# Patient Record
Sex: FEMALE | Race: WHITE | NOT HISPANIC OR LATINO
[De-identification: names, ages, dates, MRNs, and addresses within clinical notes are randomized per-mention and may not be internally consistent; named-entity substitution may affect disease eponyms.]

---

## 2020-01-14 ENCOUNTER — APPOINTMENT (OUTPATIENT)
Dept: OTOLARYNGOLOGY | Facility: HOSPITAL | Age: 60
End: 2020-01-14

## 2020-09-06 ENCOUNTER — EMERGENCY (EMERGENCY)
Facility: HOSPITAL | Age: 60
LOS: 1 days | Discharge: HOME | End: 2020-09-06
Attending: EMERGENCY MEDICINE
Payer: COMMERCIAL

## 2020-09-06 VITALS
TEMPERATURE: 97 F | RESPIRATION RATE: 16 BRPM | WEIGHT: 111.99 LBS | HEART RATE: 95 BPM | DIASTOLIC BLOOD PRESSURE: 85 MMHG | SYSTOLIC BLOOD PRESSURE: 145 MMHG

## 2020-09-06 VITALS
SYSTOLIC BLOOD PRESSURE: 136 MMHG | HEART RATE: 74 BPM | DIASTOLIC BLOOD PRESSURE: 89 MMHG | RESPIRATION RATE: 16 BRPM | TEMPERATURE: 98 F | OXYGEN SATURATION: 97 %

## 2020-09-06 LAB
ALBUMIN SERPL ELPH-MCNC: 4.2 G/DL — SIGNIFICANT CHANGE UP (ref 3.5–5)
ALP SERPL-CCNC: 71 U/L — SIGNIFICANT CHANGE UP (ref 40–120)
ALT FLD-CCNC: 19 U/L DA — SIGNIFICANT CHANGE UP (ref 10–60)
ANION GAP SERPL CALC-SCNC: 3 MMOL/L — LOW (ref 5–17)
AST SERPL-CCNC: 24 U/L — SIGNIFICANT CHANGE UP (ref 10–40)
BASOPHILS # BLD AUTO: 0.04 K/UL — SIGNIFICANT CHANGE UP (ref 0–0.2)
BASOPHILS NFR BLD AUTO: 0.6 % — SIGNIFICANT CHANGE UP (ref 0–2)
BILIRUB SERPL-MCNC: 0.3 MG/DL — SIGNIFICANT CHANGE UP (ref 0.2–1.2)
BUN SERPL-MCNC: 16 MG/DL — SIGNIFICANT CHANGE UP (ref 7–18)
CALCIUM SERPL-MCNC: 9.5 MG/DL — SIGNIFICANT CHANGE UP (ref 8.4–10.5)
CHLORIDE SERPL-SCNC: 108 MMOL/L — SIGNIFICANT CHANGE UP (ref 96–108)
CO2 SERPL-SCNC: 31 MMOL/L — SIGNIFICANT CHANGE UP (ref 22–31)
CREAT SERPL-MCNC: 0.66 MG/DL — SIGNIFICANT CHANGE UP (ref 0.5–1.3)
EOSINOPHIL # BLD AUTO: 0.01 K/UL — SIGNIFICANT CHANGE UP (ref 0–0.5)
EOSINOPHIL NFR BLD AUTO: 0.1 % — SIGNIFICANT CHANGE UP (ref 0–6)
ERYTHROCYTE [SEDIMENTATION RATE] IN BLOOD: 6 MM/HR — SIGNIFICANT CHANGE UP (ref 0–20)
GLUCOSE SERPL-MCNC: 98 MG/DL — SIGNIFICANT CHANGE UP (ref 70–99)
HCT VFR BLD CALC: 41.5 % — SIGNIFICANT CHANGE UP (ref 34.5–45)
HGB BLD-MCNC: 13.8 G/DL — SIGNIFICANT CHANGE UP (ref 11.5–15.5)
IMM GRANULOCYTES NFR BLD AUTO: 0.3 % — SIGNIFICANT CHANGE UP (ref 0–1.5)
LYMPHOCYTES # BLD AUTO: 1.28 K/UL — SIGNIFICANT CHANGE UP (ref 1–3.3)
LYMPHOCYTES # BLD AUTO: 19 % — SIGNIFICANT CHANGE UP (ref 13–44)
MCHC RBC-ENTMCNC: 29.7 PG — SIGNIFICANT CHANGE UP (ref 27–34)
MCHC RBC-ENTMCNC: 33.3 GM/DL — SIGNIFICANT CHANGE UP (ref 32–36)
MCV RBC AUTO: 89.2 FL — SIGNIFICANT CHANGE UP (ref 80–100)
MONOCYTES # BLD AUTO: 0.24 K/UL — SIGNIFICANT CHANGE UP (ref 0–0.9)
MONOCYTES NFR BLD AUTO: 3.6 % — SIGNIFICANT CHANGE UP (ref 2–14)
NEUTROPHILS # BLD AUTO: 5.13 K/UL — SIGNIFICANT CHANGE UP (ref 1.8–7.4)
NEUTROPHILS NFR BLD AUTO: 76.4 % — SIGNIFICANT CHANGE UP (ref 43–77)
NRBC # BLD: 0 /100 WBCS — SIGNIFICANT CHANGE UP (ref 0–0)
PLATELET # BLD AUTO: 263 K/UL — SIGNIFICANT CHANGE UP (ref 150–400)
POTASSIUM SERPL-MCNC: 4.6 MMOL/L — SIGNIFICANT CHANGE UP (ref 3.5–5.3)
POTASSIUM SERPL-SCNC: 4.6 MMOL/L — SIGNIFICANT CHANGE UP (ref 3.5–5.3)
PROT SERPL-MCNC: 8.3 G/DL — SIGNIFICANT CHANGE UP (ref 6–8.3)
RBC # BLD: 4.65 M/UL — SIGNIFICANT CHANGE UP (ref 3.8–5.2)
RBC # FLD: 12.1 % — SIGNIFICANT CHANGE UP (ref 10.3–14.5)
SODIUM SERPL-SCNC: 142 MMOL/L — SIGNIFICANT CHANGE UP (ref 135–145)
WBC # BLD: 6.72 K/UL — SIGNIFICANT CHANGE UP (ref 3.8–10.5)
WBC # FLD AUTO: 6.72 K/UL — SIGNIFICANT CHANGE UP (ref 3.8–10.5)

## 2020-09-06 PROCEDURE — 99284 EMERGENCY DEPT VISIT MOD MDM: CPT

## 2020-09-06 PROCEDURE — 85652 RBC SED RATE AUTOMATED: CPT

## 2020-09-06 PROCEDURE — 96374 THER/PROPH/DIAG INJ IV PUSH: CPT

## 2020-09-06 PROCEDURE — 80053 COMPREHEN METABOLIC PANEL: CPT

## 2020-09-06 PROCEDURE — 70450 CT HEAD/BRAIN W/O DYE: CPT | Mod: 26

## 2020-09-06 PROCEDURE — 99284 EMERGENCY DEPT VISIT MOD MDM: CPT | Mod: 25

## 2020-09-06 PROCEDURE — 70450 CT HEAD/BRAIN W/O DYE: CPT

## 2020-09-06 PROCEDURE — 36415 COLL VENOUS BLD VENIPUNCTURE: CPT

## 2020-09-06 PROCEDURE — 85027 COMPLETE CBC AUTOMATED: CPT

## 2020-09-06 PROCEDURE — 96375 TX/PRO/DX INJ NEW DRUG ADDON: CPT

## 2020-09-06 RX ORDER — KETOROLAC TROMETHAMINE 30 MG/ML
15 SYRINGE (ML) INJECTION ONCE
Refills: 0 | Status: DISCONTINUED | OUTPATIENT
Start: 2020-09-06 | End: 2020-09-06

## 2020-09-06 RX ORDER — MAGNESIUM SULFATE 500 MG/ML
1 VIAL (ML) INJECTION ONCE
Refills: 0 | Status: COMPLETED | OUTPATIENT
Start: 2020-09-06 | End: 2020-09-06

## 2020-09-06 RX ORDER — METOCLOPRAMIDE HCL 10 MG
10 TABLET ORAL ONCE
Refills: 0 | Status: COMPLETED | OUTPATIENT
Start: 2020-09-06 | End: 2020-09-06

## 2020-09-06 RX ADMIN — Medication 104 MILLIGRAM(S): at 16:01

## 2020-09-06 RX ADMIN — Medication 200 GRAM(S): at 16:00

## 2020-09-06 RX ADMIN — Medication 15 MILLIGRAM(S): at 16:00

## 2020-09-06 RX ADMIN — Medication 15 MILLIGRAM(S): at 16:06

## 2020-09-06 NOTE — ED PROVIDER NOTE - NEUROLOGICAL, MLM
Alert and oriented, no focal deficits, no motor or sensory deficits. Cranial nerves 2-12 intact. Neck supple.

## 2020-09-06 NOTE — ED PROVIDER NOTE - OBJECTIVE STATEMENT
60 year old female with PMHx of pituitary microadenoma presenting to the ED with complaints of frontal, banded headache that is on and off for the past week. Also endorses nausea, vomiting that is non-bloody and non-bilious, and loose stool. States that sound and light bothers her generally, but nothing more than usual. Denies trauma, respiratory symptoms, fever, vision loss, focal weakness, drug use, alcohol use, or any other complaints. Patient took Advil this morning with some relief.

## 2020-09-06 NOTE — ED PROVIDER NOTE - PROGRESS NOTE DETAILS
[place]: ct reviewed and informed pt. headache improve.  neurologically intact  dx headache likely tension vs pituitary adenoma. Must see neurosurgeon. left ambulatory.  return precautions given.

## 2020-09-06 NOTE — ED PROVIDER NOTE - CLINICAL SUMMARY MEDICAL DECISION MAKING FREE TEXT BOX
Patient presenting with tension headache. Will give migraine cocktail, order CT for reassurance, and PCP follow up. No surgery needed for monitoring pituitary microadenoma.

## 2020-09-06 NOTE — ED ADULT NURSE NOTE - CHIEF COMPLAINT QUOTE
patient complains of frontal headache x couple of weeks constantly with nausea and vomiting, patient denies visual disturbances

## 2020-09-06 NOTE — ED PROVIDER NOTE - PATIENT PORTAL LINK FT
You can access the FollowMyHealth Patient Portal offered by [facility] by registering at the following website: [email or web address]. By joining [facility]’s FollowMyHealth portal, you will also be able to view your health information using other applications (apps) compatible with our system.

## 2020-09-06 NOTE — ED PROVIDER NOTE - CHPI ED SYMPTOMS NEG
no fever/no respiratory symptoms, no visual changes, no loss of vison, no focal weakness, no photophobia, no sonophobia

## 2020-10-27 PROBLEM — Z00.00 ENCOUNTER FOR PREVENTIVE HEALTH EXAMINATION: Status: ACTIVE | Noted: 2020-10-27

## 2020-11-02 ENCOUNTER — TRANSCRIPTION ENCOUNTER (OUTPATIENT)
Age: 60
End: 2020-11-02

## 2020-11-02 ENCOUNTER — APPOINTMENT (OUTPATIENT)
Dept: SPINE | Facility: CLINIC | Age: 60
End: 2020-11-02
Payer: COMMERCIAL

## 2020-11-02 VITALS
DIASTOLIC BLOOD PRESSURE: 92 MMHG | HEIGHT: 63 IN | TEMPERATURE: 97.9 F | SYSTOLIC BLOOD PRESSURE: 138 MMHG | OXYGEN SATURATION: 95 % | HEART RATE: 73 BPM | WEIGHT: 110 LBS | BODY MASS INDEX: 19.49 KG/M2

## 2020-11-02 DIAGNOSIS — Z78.9 OTHER SPECIFIED HEALTH STATUS: ICD-10-CM

## 2020-11-02 DIAGNOSIS — Z80.9 FAMILY HISTORY OF MALIGNANT NEOPLASM, UNSPECIFIED: ICD-10-CM

## 2020-11-02 DIAGNOSIS — Z86.39 PERSONAL HISTORY OF OTHER ENDOCRINE, NUTRITIONAL AND METABOLIC DISEASE: ICD-10-CM

## 2020-11-02 PROCEDURE — 99204 OFFICE O/P NEW MOD 45 MIN: CPT

## 2020-11-02 PROCEDURE — 99072 ADDL SUPL MATRL&STAF TM PHE: CPT

## 2020-11-02 RX ORDER — SIMVASTATIN 40 MG/1
40 TABLET, FILM COATED ORAL
Refills: 0 | Status: ACTIVE | COMMUNITY

## 2020-12-02 ENCOUNTER — APPOINTMENT (OUTPATIENT)
Dept: OTOLARYNGOLOGY | Facility: CLINIC | Age: 60
End: 2020-12-02
Payer: COMMERCIAL

## 2020-12-02 VITALS
SYSTOLIC BLOOD PRESSURE: 129 MMHG | HEIGHT: 63 IN | TEMPERATURE: 97.3 F | HEART RATE: 81 BPM | DIASTOLIC BLOOD PRESSURE: 86 MMHG | WEIGHT: 107 LBS | BODY MASS INDEX: 18.96 KG/M2

## 2020-12-02 PROCEDURE — 31231 NASAL ENDOSCOPY DX: CPT

## 2020-12-02 PROCEDURE — 99072 ADDL SUPL MATRL&STAF TM PHE: CPT

## 2020-12-02 PROCEDURE — 99204 OFFICE O/P NEW MOD 45 MIN: CPT | Mod: 25

## 2020-12-02 NOTE — PROCEDURE
[FreeTextEntry6] : Flexible scope #2 was used. Right nasal passage with normal inferior, middle and superior turbinates. Nasal passage patent with clear middle meatus and sphenoethmoid recess. Left nasal passage with normal inferior, middle and superior turbinates. Nasal passage was patent with clear middle meatus and sphenoethmoid recess. No mucopurulence or polyps appreciated. Nasopharynx clear.\par \par

## 2020-12-02 NOTE — REASON FOR VISIT
[Initial Consultation] : an initial consultation for [FreeTextEntry2] : Going for TSPR with Dr. Hernandez 1/14/2020

## 2020-12-02 NOTE — CONSULT LETTER
[Dear  ___] : Dear  [unfilled], [Consult Letter:] : I had the pleasure of evaluating your patient, [unfilled]. [Please see my note below.] : Please see my note below. [Consult Closing:] : Thank you very much for allowing me to participate in the care of this patient.  If you have any questions, please do not hesitate to contact me. [Sincerely,] : Sincerely, [FreeTextEntry3] : Palma Kirkpatrick MD\par Otolaryngology and Cranial Base Surgery\par Attending Physician - Department of Otolaryngology and Head & Neck Surgery\par [facility]\par  - [employer] [facility]\par Office: [phone]\par Fax: [phone]\par

## 2020-12-02 NOTE — ASSESSMENT
[FreeTextEntry1] : Going for TSPR with Dr. Hernandez 1/14/2020\par - discussed my role in the surgery including the nasal and sinus procedures that I will be performing in order to provide the neurosurgeon access to the pituitary tumor\par - expected post-op hospital course discussed including need for possible repair of spinal fluid leak and close observation for 2-3 days\par - post-op care consisting of nasal saline irrigations was reviewed, and [facility] sinus pamphlet was given to patient to obtain prior to surgery so they have ready to use on discharge home\par - discussed post-operative issues including nasal congestion, loss of sense of smell which should be temporary but can be permanent, bloody nasal drainage, facial pressure/headaches, bleeding, and infection\par - all questions answered and patient will call if any further concerns\par - plan to see patient on AM of surgery\par

## 2020-12-02 NOTE — HISTORY OF PRESENT ILLNESS
[de-identified] : Going for TSPR with Dr. Hernandez 1/14/2020.  Had Prior sublabial pituitary approach about 7 years ago. \par No nasal congestion, bleeding, or Clear d/c.  Vision is okay.  Sense of smell and taste are good.  Has numbness to upper teeth and gums from prior surgery.

## 2020-12-04 ENCOUNTER — APPOINTMENT (OUTPATIENT)
Dept: ENDOCRINOLOGY | Facility: CLINIC | Age: 60
End: 2020-12-04
Payer: COMMERCIAL

## 2020-12-04 VITALS
SYSTOLIC BLOOD PRESSURE: 122 MMHG | BODY MASS INDEX: 20.2 KG/M2 | TEMPERATURE: 98.6 F | HEART RATE: 81 BPM | RESPIRATION RATE: 16 BRPM | WEIGHT: 114 LBS | DIASTOLIC BLOOD PRESSURE: 86 MMHG | OXYGEN SATURATION: 96 % | HEIGHT: 63 IN

## 2020-12-04 PROCEDURE — 99072 ADDL SUPL MATRL&STAF TM PHE: CPT

## 2020-12-04 PROCEDURE — 99204 OFFICE O/P NEW MOD 45 MIN: CPT

## 2020-12-04 NOTE — REASON FOR VISIT
[Consultation] : a consultation visit [Pituitary Evaluation/ Disorder] : pituitary evaluation/disorder

## 2020-12-06 NOTE — HISTORY OF PRESENT ILLNESS
[FreeTextEntry1] : 60 year female  referred for pituitary evaluation \par \par Mrs Pena has been diagnosed with an apparently non-secreting 2 cm pituitary adenoma in 2013, and underwent a TSSR by Dr. Newsome. She was briefly seen by an endocrinologist in [place], and remembers having only 1 follow up pituitary MRI since then. She was doing well over the past several years,  but recently developed persistent headaches, and a repeat MRI was done showing a recurrent pituitary tumor. \par MRI (105/20)-  3.8x2.8x2.8 sellar mass extending suprasellar and into the left cavernous sinus, encasing left internal carotid artery  and left auditory canal (with mass effect on the left 4th, 5th. 6th CN). + Mass effect on the optic chiasm. + mild effect on hypothalamus. \par She complains of some balance issues within past 6 months, but denies excessive fatigue, weight changes, peripheral vision changes ;  cold/heat intolerance ; new purple stretch marks or easy bruising  jaw protruding, change in facial features or change in shoe/hat/ring size ;  dizziness/nausea/vomiting/abdominal pain ; galactorrhea. Also, she denies diaphoresis or sweating, chest pain or palpitations, SOB, diarrhea or constipation, anxiety or tremor, muscle aches\par \par

## 2020-12-06 NOTE — ASSESSMENT
[FreeTextEntry1] : Recurrent pituitary adenoma with mass effect.\par - planned for TSPR on 01/14/20\par - preoperative evaluation of the pituitary endocrine function\par - refer to a neuro-ophthalmologist\par - advised on post-operative endocrine complications and a possibility of a permanent panhypopituitarism, especially since she might require post-operative radiation therapy.\par She'll follow up about 1-2 weeks post surgery, or sooner prn\par

## 2020-12-06 NOTE — CONSULT LETTER
[Dear  ___] : Dear  [unfilled], [Sincerely,] : Sincerely, [FreeTextEntry1] : Thank you for referring  Ms. KIP KING to me for evaluation and treatment. Please, see attached consultation note. As always, if there are specific questions you would like to discuss, please feel free to contact me.\par Thank you for the courtesy of this evaluation.\par  [FreeTextEntry3] : Gabrielle Jones MD, FACE, ECNU\par

## 2020-12-07 ENCOUNTER — TRANSCRIPTION ENCOUNTER (OUTPATIENT)
Age: 60
End: 2020-12-07

## 2020-12-10 ENCOUNTER — TRANSCRIPTION ENCOUNTER (OUTPATIENT)
Age: 60
End: 2020-12-10

## 2020-12-10 LAB
ACTH SER-ACNC: 33.9 PG/ML
ALBUMIN SERPL ELPH-MCNC: 4.7 G/DL
ALP BLD-CCNC: 72 U/L
ALT SERPL-CCNC: 8 U/L
ANION GAP SERPL CALC-SCNC: 12 MMOL/L
AST SERPL-CCNC: 26 U/L
BASOPHILS # BLD AUTO: 0.06 K/UL
BASOPHILS NFR BLD AUTO: 1.2 %
BILIRUB SERPL-MCNC: 0.3 MG/DL
BUN SERPL-MCNC: 15 MG/DL
CALCIUM SERPL-MCNC: 9.9 MG/DL
CHLORIDE SERPL-SCNC: 104 MMOL/L
CO2 SERPL-SCNC: 27 MMOL/L
CORTIS SERPL-MCNC: 13.6 UG/DL
CREAT 24H UR-MCNC: 0.8 G/24 H
CREAT ?TM UR-MCNC: 44 MG/DL
CREAT SERPL-MCNC: 0.97 MG/DL
EOSINOPHIL # BLD AUTO: 0.07 K/UL
EOSINOPHIL NFR BLD AUTO: 1.4 %
ESTRADIOL SERPL-MCNC: <5 PG/ML
FSH SERPL-MCNC: 100 IU/L
GH SERPL-MCNC: 6.54 NG/ML
GLUCOSE SERPL-MCNC: 91 MG/DL
HCT VFR BLD CALC: 42.2 %
HGB BLD-MCNC: 13.3 G/DL
IGF-1 INTERP: NORMAL
IGF-I BLD-MCNC: 210 NG/ML
IMM GRANULOCYTES NFR BLD AUTO: 0 %
LH SERPL-ACNC: 42.5 IU/L
LYMPHOCYTES # BLD AUTO: 1.59 K/UL
LYMPHOCYTES NFR BLD AUTO: 31.9 %
MAN DIFF?: NORMAL
MCHC RBC-ENTMCNC: 28.7 PG
MCHC RBC-ENTMCNC: 31.5 GM/DL
MCV RBC AUTO: 90.9 FL
MONOCYTES # BLD AUTO: 0.42 K/UL
MONOCYTES NFR BLD AUTO: 8.4 %
NEUTROPHILS # BLD AUTO: 2.84 K/UL
NEUTROPHILS NFR BLD AUTO: 57.1 %
OSMOLALITY SERPL: 300 MOSMOL/KG
PLATELET # BLD AUTO: 318 K/UL
POTASSIUM SERPL-SCNC: 4.8 MMOL/L
PROT ?TM UR-MCNC: 24 HR
PROT SERPL-MCNC: 7.2 G/DL
RBC # BLD: 4.64 M/UL
RBC # FLD: 12.7 %
SODIUM SERPL-SCNC: 143 MMOL/L
SPECIMEN VOL 24H UR: 1875 ML
T4 FREE SERPL-MCNC: 1.1 NG/DL
TSH SERPL-ACNC: 1.9 UIU/ML
WBC # FLD AUTO: 4.98 K/UL

## 2020-12-11 LAB
PROLACTIN SERPL-MCNC: 20.7 NG/ML
PROLACTIN SERPL-MCNC: 21.6 NG/ML

## 2020-12-14 ENCOUNTER — TRANSCRIPTION ENCOUNTER (OUTPATIENT)
Age: 60
End: 2020-12-14

## 2020-12-14 LAB
CORTIS 24H UR-MCNC: 24 H
CORTIS 24H UR-MRATE: 19 MCG/24 H
SPECIMEN VOL 24H UR: 1875 ML

## 2020-12-23 LAB — ALPHA SUBUNIT SERPL-MCNC: 0.68 NG/ML

## 2020-12-28 LAB
MONOMERIC PROLACTIN (ICMA)*: 24 NG/ML
PERCENT MACROPROLACTIN: 0 %
PROLACTIN, SERUM (ICMA)*: 24 NG/ML

## 2020-12-29 ENCOUNTER — OUTPATIENT (OUTPATIENT)
Dept: OUTPATIENT SERVICES | Facility: HOSPITAL | Age: 60
LOS: 1 days | End: 2020-12-29
Payer: COMMERCIAL

## 2020-12-29 ENCOUNTER — APPOINTMENT (OUTPATIENT)
Dept: CT IMAGING | Facility: IMAGING CENTER | Age: 60
End: 2020-12-29

## 2020-12-29 ENCOUNTER — APPOINTMENT (OUTPATIENT)
Dept: MRI IMAGING | Facility: IMAGING CENTER | Age: 60
End: 2020-12-29

## 2020-12-29 DIAGNOSIS — D35.2 BENIGN NEOPLASM OF PITUITARY GLAND: ICD-10-CM

## 2020-12-29 PROCEDURE — 70553 MRI BRAIN STEM W/O & W/DYE: CPT | Mod: 26

## 2020-12-29 PROCEDURE — 70450 CT HEAD/BRAIN W/O DYE: CPT

## 2020-12-29 PROCEDURE — 70450 CT HEAD/BRAIN W/O DYE: CPT | Mod: 26

## 2020-12-29 PROCEDURE — 70553 MRI BRAIN STEM W/O & W/DYE: CPT

## 2020-12-29 PROCEDURE — A9585: CPT

## 2021-01-04 ENCOUNTER — OUTPATIENT (OUTPATIENT)
Dept: OUTPATIENT SERVICES | Facility: HOSPITAL | Age: 61
LOS: 1 days | End: 2021-01-04
Payer: COMMERCIAL

## 2021-01-04 VITALS
TEMPERATURE: 98 F | DIASTOLIC BLOOD PRESSURE: 84 MMHG | SYSTOLIC BLOOD PRESSURE: 124 MMHG | RESPIRATION RATE: 16 BRPM | HEART RATE: 70 BPM | WEIGHT: 111.99 LBS | OXYGEN SATURATION: 98 % | HEIGHT: 63 IN

## 2021-01-04 DIAGNOSIS — D35.2 BENIGN NEOPLASM OF PITUITARY GLAND: ICD-10-CM

## 2021-01-04 DIAGNOSIS — Z86.018 PERSONAL HISTORY OF OTHER BENIGN NEOPLASM: Chronic | ICD-10-CM

## 2021-01-04 DIAGNOSIS — Z29.9 ENCOUNTER FOR PROPHYLACTIC MEASURES, UNSPECIFIED: ICD-10-CM

## 2021-01-04 DIAGNOSIS — Z98.890 OTHER SPECIFIED POSTPROCEDURAL STATES: Chronic | ICD-10-CM

## 2021-01-04 LAB
ANION GAP SERPL CALC-SCNC: 11 MMOL/L — SIGNIFICANT CHANGE UP (ref 5–17)
BLD GP AB SCN SERPL QL: NEGATIVE — SIGNIFICANT CHANGE UP
BUN SERPL-MCNC: 17 MG/DL — SIGNIFICANT CHANGE UP (ref 7–23)
CALCIUM SERPL-MCNC: 10.4 MG/DL — SIGNIFICANT CHANGE UP (ref 8.4–10.5)
CHLORIDE SERPL-SCNC: 104 MMOL/L — SIGNIFICANT CHANGE UP (ref 96–108)
CO2 SERPL-SCNC: 28 MMOL/L — SIGNIFICANT CHANGE UP (ref 22–31)
CREAT SERPL-MCNC: 0.71 MG/DL — SIGNIFICANT CHANGE UP (ref 0.5–1.3)
GLUCOSE SERPL-MCNC: 91 MG/DL — SIGNIFICANT CHANGE UP (ref 70–99)
HCT VFR BLD CALC: 42.8 % — SIGNIFICANT CHANGE UP (ref 34.5–45)
HGB BLD-MCNC: 13.3 G/DL — SIGNIFICANT CHANGE UP (ref 11.5–15.5)
MCHC RBC-ENTMCNC: 29 PG — SIGNIFICANT CHANGE UP (ref 27–34)
MCHC RBC-ENTMCNC: 31.1 GM/DL — LOW (ref 32–36)
MCV RBC AUTO: 93.4 FL — SIGNIFICANT CHANGE UP (ref 80–100)
MRSA PCR RESULT.: SIGNIFICANT CHANGE UP
NRBC # BLD: 0 /100 WBCS — SIGNIFICANT CHANGE UP (ref 0–0)
PLATELET # BLD AUTO: 307 K/UL — SIGNIFICANT CHANGE UP (ref 150–400)
POTASSIUM SERPL-MCNC: 5.3 MMOL/L — SIGNIFICANT CHANGE UP (ref 3.5–5.3)
POTASSIUM SERPL-SCNC: 5.3 MMOL/L — SIGNIFICANT CHANGE UP (ref 3.5–5.3)
RBC # BLD: 4.58 M/UL — SIGNIFICANT CHANGE UP (ref 3.8–5.2)
RBC # FLD: 12.7 % — SIGNIFICANT CHANGE UP (ref 10.3–14.5)
RH IG SCN BLD-IMP: POSITIVE — SIGNIFICANT CHANGE UP
S AUREUS DNA NOSE QL NAA+PROBE: SIGNIFICANT CHANGE UP
SODIUM SERPL-SCNC: 143 MMOL/L — SIGNIFICANT CHANGE UP (ref 135–145)
WBC # BLD: 5.99 K/UL — SIGNIFICANT CHANGE UP (ref 3.8–10.5)
WBC # FLD AUTO: 5.99 K/UL — SIGNIFICANT CHANGE UP (ref 3.8–10.5)

## 2021-01-04 PROCEDURE — 80048 BASIC METABOLIC PNL TOTAL CA: CPT

## 2021-01-04 PROCEDURE — G0463: CPT

## 2021-01-04 PROCEDURE — 86900 BLOOD TYPING SEROLOGIC ABO: CPT

## 2021-01-04 PROCEDURE — 87640 STAPH A DNA AMP PROBE: CPT

## 2021-01-04 PROCEDURE — 86901 BLOOD TYPING SEROLOGIC RH(D): CPT

## 2021-01-04 PROCEDURE — 87641 MR-STAPH DNA AMP PROBE: CPT

## 2021-01-04 PROCEDURE — 85027 COMPLETE CBC AUTOMATED: CPT

## 2021-01-04 PROCEDURE — 86850 RBC ANTIBODY SCREEN: CPT

## 2021-01-04 NOTE — H&P PST ADULT - NSICDXPASTMEDICALHX_GEN_ALL_CORE_FT
PAST MEDICAL HISTORY:  Chronic neck pain     COVID-19 virus infection 09/2020 - headaches only, CT scan showed piruitary adenoma recurrence    Hyperlipidemia     Pituitary adenoma 2013, recurrence 2020

## 2021-01-04 NOTE — H&P PST ADULT - HISTORY OF PRESENT ILLNESS
60 y.o. female with h/o pituitary adenoma s/p removal (sublabial approach) in 2013, who was doing fine for several years but recently has developed persistent frontal headaches, repeat MRI on 09/06/20 revealed recurrent pituitary tumor; she was also positive for Covid 19 on the same day, no other symptoms. She is scheduled for Endoscopic Transphenoidal Removal Pituitary Tumor.

## 2021-01-04 NOTE — H&P PST ADULT - NSICDXPASTSURGICALHX_GEN_ALL_CORE_FT
PAST SURGICAL HISTORY:  H/O cosmetic plastic surgery blepharoplasty, face lift, 07/2020    History of pituitary adenoma excision - sulabial approach, 2013

## 2021-01-04 NOTE — H&P PST ADULT - NSICDXPROBLEM_GEN_ALL_CORE_FT
PROBLEM DIAGNOSES  Problem: Pituitary adenoma  Assessment and Plan: Endoscopic Transphenoidal Removal Pituitary Tumor.   on 01/06/21.     Problem: Need for prophylactic measure  Assessment and Plan: The Caprini score indicates this patient is at risk for a VTE event (score 3-5).  Most surgical patients in this group would benefit from pharmacologic prophylaxis.  The surgical team will determine the balance between VTE risk and bleeding risk

## 2021-01-04 NOTE — H&P PST ADULT - ASSESSMENT
MANDYI VTE 2.0 SCORE [CLOT updated 2019]    AGE RELATED RISK FACTORS                                                       MOBILITY RELATED FACTORS  [ ] Age 41-60 years                                            (1 Point)                    [ ] Bed rest                                                        (1 Point)  [x] Age: 61-74 years                                           (2 Points)                  [ ] Plaster cast                                                   (2 Points)  [ ] Age= 75 years                                              (3 Points)                    [ ] Bed bound for more than 72 hours                 (2 Points)    DISEASE RELATED RISK FACTORS                                               GENDER SPECIFIC FACTORS  [ ] Edema in the lower extremities                       (1 Point)              [ ] Pregnancy                                                     (1 Point)  [ ] Varicose veins                                               (1 Point)                     [ ] Post-partum < 6 weeks                                   (1 Point)             [ ] BMI > 25 Kg/m2                                            (1 Point)                     [ ] Hormonal therapy  or oral contraception          (1 Point)                 [ ] Sepsis (in the previous month)                        (1 Point)               [ ] History of pregnancy complications                 (1 point)  [ ] Pneumonia or serious lung disease                                               [ ] Unexplained or recurrent                     (1 Point)           (in the previous month)                               (1 Point)  [ ] Abnormal pulmonary function test                     (1 Point)                 SURGERY RELATED RISK FACTORS  [ ] Acute myocardial infarction                              (1 Point)               [ ]  Section                                             (1 Point)  [ ] Congestive heart failure (in the previous month)  (1 Point)      [ ] Minor surgery                                                  (1 Point)   [ ] Inflammatory bowel disease                             (1 Point)               [ ] Arthroscopic surgery                                        (2 Points)  [ ] Central venous access                                      (2 Points)                [x] General surgery lasting more than 45 minutes (2 points)  [ ] Malignancy- Present or previous                   (2 Points)                [ ] Elective arthroplasty                                         (5 points)    [ ] Stroke (in the previous month)                          (5 Points)                                                                                                                                                           HEMATOLOGY RELATED FACTORS                                                 TRAUMA RELATED RISK FACTORS  [ ] Prior episodes of VTE                                     (3 Points)                [ ] Fracture of the hip, pelvis, or leg                       (5 Points)  [ ] Positive family history for VTE                         (3 Points)             [ ] Acute spinal cord injury (in the previous month)  (5 Points)  [ ] Prothrombin 77006 A                                     (3 Points)               [ ] Paralysis  (less than 1 month)                             (5 Points)  [ ] Factor V Leiden                                             (3 Points)                  [ ] Multiple Trauma within 1 month                        (5 Points)  [ ] Lupus anticoagulants                                     (3 Points)                                                           [ ] Anticardiolipin antibodies                               (3 Points)                                                       [ ] High homocysteine in the blood                      (3 Points)                                             [ ] Other congenital or acquired thrombophilia      (3 Points)                                                [ ] Heparin induced thrombocytopenia                  (3 Points)                                     Total Score [    4     ]

## 2021-01-04 NOTE — H&P PST ADULT - NSANTHOSAYNRD_GEN_A_CORE
No. LIN screening performed.  STOP BANG Legend: 0-2 = LOW Risk; 3-4 = INTERMEDIATE Risk; 5-8 = HIGH Risk

## 2021-01-11 ENCOUNTER — OUTPATIENT (OUTPATIENT)
Dept: OUTPATIENT SERVICES | Facility: HOSPITAL | Age: 61
LOS: 1 days | End: 2021-01-11
Payer: COMMERCIAL

## 2021-01-11 DIAGNOSIS — Z86.018 PERSONAL HISTORY OF OTHER BENIGN NEOPLASM: Chronic | ICD-10-CM

## 2021-01-11 DIAGNOSIS — Z98.890 OTHER SPECIFIED POSTPROCEDURAL STATES: Chronic | ICD-10-CM

## 2021-01-11 DIAGNOSIS — Z20.828 CONTACT WITH AND (SUSPECTED) EXPOSURE TO OTHER VIRAL COMMUNICABLE DISEASES: ICD-10-CM

## 2021-01-11 PROBLEM — E78.5 HYPERLIPIDEMIA, UNSPECIFIED: Chronic | Status: ACTIVE | Noted: 2021-01-04

## 2021-01-11 PROBLEM — M54.2 CERVICALGIA: Chronic | Status: ACTIVE | Noted: 2021-01-04

## 2021-01-11 PROBLEM — D35.2 BENIGN NEOPLASM OF PITUITARY GLAND: Chronic | Status: ACTIVE | Noted: 2021-01-04

## 2021-01-11 PROBLEM — U07.1 COVID-19: Chronic | Status: ACTIVE | Noted: 2021-01-04

## 2021-01-11 LAB — SARS-COV-2 RNA SPEC QL NAA+PROBE: SIGNIFICANT CHANGE UP

## 2021-01-11 PROCEDURE — U0005: CPT

## 2021-01-11 PROCEDURE — U0003: CPT

## 2021-01-11 PROCEDURE — C9803: CPT

## 2021-01-13 ENCOUNTER — TRANSCRIPTION ENCOUNTER (OUTPATIENT)
Age: 61
End: 2021-01-13

## 2021-01-14 ENCOUNTER — RESULT REVIEW (OUTPATIENT)
Age: 61
End: 2021-01-14

## 2021-01-14 ENCOUNTER — APPOINTMENT (OUTPATIENT)
Dept: OTOLARYNGOLOGY | Facility: HOSPITAL | Age: 61
End: 2021-01-14

## 2021-01-14 ENCOUNTER — INPATIENT (INPATIENT)
Facility: HOSPITAL | Age: 61
LOS: 2 days | Discharge: HOME | DRG: 614 | End: 2021-01-17
Attending: NEUROLOGICAL SURGERY | Admitting: NEUROLOGICAL SURGERY
Payer: COMMERCIAL

## 2021-01-14 ENCOUNTER — APPOINTMENT (OUTPATIENT)
Dept: SPINE | Facility: HOSPITAL | Age: 61
End: 2021-01-14

## 2021-01-14 VITALS
OXYGEN SATURATION: 100 % | HEIGHT: 63 IN | DIASTOLIC BLOOD PRESSURE: 74 MMHG | SYSTOLIC BLOOD PRESSURE: 114 MMHG | TEMPERATURE: 98 F | HEART RATE: 71 BPM | RESPIRATION RATE: 18 BRPM | WEIGHT: 111.99 LBS

## 2021-01-14 DIAGNOSIS — D35.2 BENIGN NEOPLASM OF PITUITARY GLAND: ICD-10-CM

## 2021-01-14 DIAGNOSIS — Z98.890 OTHER SPECIFIED POSTPROCEDURAL STATES: Chronic | ICD-10-CM

## 2021-01-14 DIAGNOSIS — Z86.018 PERSONAL HISTORY OF OTHER BENIGN NEOPLASM: Chronic | ICD-10-CM

## 2021-01-14 LAB
ANION GAP SERPL CALC-SCNC: 11 MMOL/L — SIGNIFICANT CHANGE UP (ref 5–17)
ANION GAP SERPL CALC-SCNC: 14 MMOL/L — SIGNIFICANT CHANGE UP (ref 5–17)
BUN SERPL-MCNC: 12 MG/DL — SIGNIFICANT CHANGE UP (ref 7–23)
BUN SERPL-MCNC: 9 MG/DL — SIGNIFICANT CHANGE UP (ref 7–23)
CALCIUM SERPL-MCNC: 7.6 MG/DL — LOW (ref 8.4–10.5)
CALCIUM SERPL-MCNC: 7.6 MG/DL — LOW (ref 8.4–10.5)
CHLORIDE SERPL-SCNC: 106 MMOL/L — SIGNIFICANT CHANGE UP (ref 96–108)
CHLORIDE SERPL-SCNC: 108 MMOL/L — SIGNIFICANT CHANGE UP (ref 96–108)
CO2 SERPL-SCNC: 21 MMOL/L — LOW (ref 22–31)
CO2 SERPL-SCNC: 23 MMOL/L — SIGNIFICANT CHANGE UP (ref 22–31)
CREAT SERPL-MCNC: 0.49 MG/DL — LOW (ref 0.5–1.3)
CREAT SERPL-MCNC: 0.5 MG/DL — SIGNIFICANT CHANGE UP (ref 0.5–1.3)
GLUCOSE SERPL-MCNC: 119 MG/DL — HIGH (ref 70–99)
GLUCOSE SERPL-MCNC: 147 MG/DL — HIGH (ref 70–99)
HCT VFR BLD CALC: 36.1 % — SIGNIFICANT CHANGE UP (ref 34.5–45)
HGB BLD-MCNC: 11.9 G/DL — SIGNIFICANT CHANGE UP (ref 11.5–15.5)
MAGNESIUM SERPL-MCNC: 1.8 MG/DL — SIGNIFICANT CHANGE UP (ref 1.6–2.6)
MCHC RBC-ENTMCNC: 29.7 PG — SIGNIFICANT CHANGE UP (ref 27–34)
MCHC RBC-ENTMCNC: 33 GM/DL — SIGNIFICANT CHANGE UP (ref 32–36)
MCV RBC AUTO: 90 FL — SIGNIFICANT CHANGE UP (ref 80–100)
NRBC # BLD: 0 /100 WBCS — SIGNIFICANT CHANGE UP (ref 0–0)
PHOSPHATE SERPL-MCNC: 6.6 MG/DL — HIGH (ref 2.5–4.5)
PLATELET # BLD AUTO: 267 K/UL — SIGNIFICANT CHANGE UP (ref 150–400)
POTASSIUM SERPL-MCNC: 3.5 MMOL/L — SIGNIFICANT CHANGE UP (ref 3.5–5.3)
POTASSIUM SERPL-MCNC: 3.6 MMOL/L — SIGNIFICANT CHANGE UP (ref 3.5–5.3)
POTASSIUM SERPL-SCNC: 3.5 MMOL/L — SIGNIFICANT CHANGE UP (ref 3.5–5.3)
POTASSIUM SERPL-SCNC: 3.6 MMOL/L — SIGNIFICANT CHANGE UP (ref 3.5–5.3)
RBC # BLD: 4.01 M/UL — SIGNIFICANT CHANGE UP (ref 3.8–5.2)
RBC # FLD: 12.3 % — SIGNIFICANT CHANGE UP (ref 10.3–14.5)
RH IG SCN BLD-IMP: POSITIVE — SIGNIFICANT CHANGE UP
SODIUM SERPL-SCNC: 140 MMOL/L — SIGNIFICANT CHANGE UP (ref 135–145)
SODIUM SERPL-SCNC: 143 MMOL/L — SIGNIFICANT CHANGE UP (ref 135–145)
WBC # BLD: 11.36 K/UL — HIGH (ref 3.8–10.5)
WBC # FLD AUTO: 11.36 K/UL — HIGH (ref 3.8–10.5)

## 2021-01-14 PROCEDURE — 99231 SBSQ HOSP IP/OBS SF/LOW 25: CPT

## 2021-01-14 PROCEDURE — 61782 SCAN PROC CRANIAL EXTRA: CPT

## 2021-01-14 PROCEDURE — 62165 REMOVE PITUIT TUMOR W/SCOPE: CPT | Mod: 62

## 2021-01-14 PROCEDURE — 99291 CRITICAL CARE FIRST HOUR: CPT

## 2021-01-14 PROCEDURE — 88305 TISSUE EXAM BY PATHOLOGIST: CPT | Mod: 26

## 2021-01-14 PROCEDURE — 88360 TUMOR IMMUNOHISTOCHEM/MANUAL: CPT | Mod: 26

## 2021-01-14 PROCEDURE — 15576 PEDICLE E/N/E/L/NTRORAL: CPT | Mod: RT

## 2021-01-14 PROCEDURE — 61781 SCAN PROC CRANIAL INTRA: CPT

## 2021-01-14 PROCEDURE — 88341 IMHCHEM/IMCYTCHM EA ADD ANTB: CPT | Mod: 26,59

## 2021-01-14 PROCEDURE — 88342 IMHCHEM/IMCYTCHM 1ST ANTB: CPT | Mod: 26,59

## 2021-01-14 RX ORDER — SODIUM CHLORIDE 9 MG/ML
3 INJECTION INTRAMUSCULAR; INTRAVENOUS; SUBCUTANEOUS EVERY 8 HOURS
Refills: 0 | Status: DISCONTINUED | OUTPATIENT
Start: 2021-01-14 | End: 2021-01-14

## 2021-01-14 RX ORDER — SODIUM CHLORIDE 0.65 %
2 AEROSOL, SPRAY (ML) NASAL
Refills: 0 | Status: DISCONTINUED | OUTPATIENT
Start: 2021-01-14 | End: 2021-01-17

## 2021-01-14 RX ORDER — VANCOMYCIN HCL 1 G
1000 VIAL (EA) INTRAVENOUS EVERY 12 HOURS
Refills: 0 | Status: COMPLETED | OUTPATIENT
Start: 2021-01-14 | End: 2021-01-15

## 2021-01-14 RX ORDER — ONDANSETRON 8 MG/1
4 TABLET, FILM COATED ORAL ONCE
Refills: 0 | Status: DISCONTINUED | OUTPATIENT
Start: 2021-01-14 | End: 2021-01-15

## 2021-01-14 RX ORDER — ACETAMINOPHEN 500 MG
650 TABLET ORAL EVERY 6 HOURS
Refills: 0 | Status: DISCONTINUED | OUTPATIENT
Start: 2021-01-14 | End: 2021-01-17

## 2021-01-14 RX ORDER — CHLORHEXIDINE GLUCONATE 213 G/1000ML
1 SOLUTION TOPICAL ONCE
Refills: 0 | Status: DISCONTINUED | OUTPATIENT
Start: 2021-01-14 | End: 2021-01-14

## 2021-01-14 RX ORDER — DEXTROSE MONOHYDRATE, SODIUM CHLORIDE, AND POTASSIUM CHLORIDE 50; .745; 4.5 G/1000ML; G/1000ML; G/1000ML
1000 INJECTION, SOLUTION INTRAVENOUS
Refills: 0 | Status: DISCONTINUED | OUTPATIENT
Start: 2021-01-14 | End: 2021-01-15

## 2021-01-14 RX ORDER — SIMVASTATIN 20 MG/1
20 TABLET, FILM COATED ORAL AT BEDTIME
Refills: 0 | Status: DISCONTINUED | OUTPATIENT
Start: 2021-01-14 | End: 2021-01-17

## 2021-01-14 RX ORDER — ONDANSETRON 8 MG/1
4 TABLET, FILM COATED ORAL EVERY 6 HOURS
Refills: 0 | Status: DISCONTINUED | OUTPATIENT
Start: 2021-01-14 | End: 2021-01-17

## 2021-01-14 RX ORDER — VANCOMYCIN HCL 1 G
750 VIAL (EA) INTRAVENOUS ONCE
Refills: 0 | Status: COMPLETED | OUTPATIENT
Start: 2021-01-14 | End: 2021-01-14

## 2021-01-14 RX ORDER — LIDOCAINE HCL 20 MG/ML
0.2 VIAL (ML) INJECTION ONCE
Refills: 0 | Status: DISCONTINUED | OUTPATIENT
Start: 2021-01-14 | End: 2021-01-14

## 2021-01-14 RX ORDER — POTASSIUM CHLORIDE 20 MEQ
40 PACKET (EA) ORAL ONCE
Refills: 0 | Status: COMPLETED | OUTPATIENT
Start: 2021-01-14 | End: 2021-01-14

## 2021-01-14 RX ORDER — HYDROMORPHONE HYDROCHLORIDE 2 MG/ML
0.25 INJECTION INTRAMUSCULAR; INTRAVENOUS; SUBCUTANEOUS
Refills: 0 | Status: DISCONTINUED | OUTPATIENT
Start: 2021-01-14 | End: 2021-01-15

## 2021-01-14 RX ORDER — SIMVASTATIN 20 MG/1
1 TABLET, FILM COATED ORAL
Qty: 0 | Refills: 0 | DISCHARGE

## 2021-01-14 RX ADMIN — SIMVASTATIN 20 MILLIGRAM(S): 20 TABLET, FILM COATED ORAL at 22:28

## 2021-01-14 RX ADMIN — Medication 40 MILLIEQUIVALENT(S): at 17:05

## 2021-01-14 RX ADMIN — Medication 250 MILLIGRAM(S): at 17:05

## 2021-01-14 RX ADMIN — DEXTROSE MONOHYDRATE, SODIUM CHLORIDE, AND POTASSIUM CHLORIDE 75 MILLILITER(S): 50; .745; 4.5 INJECTION, SOLUTION INTRAVENOUS at 18:36

## 2021-01-14 RX ADMIN — Medication 650 MILLIGRAM(S): at 22:31

## 2021-01-14 RX ADMIN — SODIUM CHLORIDE 3 MILLILITER(S): 9 INJECTION INTRAMUSCULAR; INTRAVENOUS; SUBCUTANEOUS at 10:25

## 2021-01-14 RX ADMIN — Medication 2 SPRAY(S): at 17:05

## 2021-01-14 NOTE — CHART NOTE - NSCHARTNOTEFT_GEN_A_CORE
CAPRINI SCORE [CLOT] Score on Admission for     AGE RELATED RISK FACTORS                                                       MOBILITY RELATED FACTORS  [ ] Age 41-60 years                                            (1 Point)                  [ ] Bed rest                                                        (1 Point)  [x ] Age: 61-74 years                                           (2 Points)                 [ ] Plaster cast                                                   (2 Points)  [ ] Age= 75 years                                              (3 Points)                 [ ] Bed bound for more than 72 hours                 (2 Points)    DISEASE RELATED RISK FACTORS                                               GENDER SPECIFIC FACTORS  [ ] Edema in the lower extremities                       (1 Point)                  [ ] Pregnancy                                                     (1 Point)  [ ] Varicose veins                                               (1 Point)                  [ ] Post-partum < 6 weeks                                   (1 Point)             [ ] BMI > 25 Kg/m2                                            (1 Point)                  [ ] Hormonal therapy  or oral contraception          (1 Point)                 [ ] Sepsis (in the previous month)                        (1 Point)                  [ ] History of pregnancy complications                 (1 point)  [ ] Pneumonia or serious lung disease                                               [ ] Unexplained or recurrent                     (1 Point)           (in the previous month)                               (1 Point)  [ ] Abnormal pulmonary function test                     (1 Point)                 SURGERY RELATED RISK FACTORS (include planned surgeries)  [ ] Acute myocardial infarction                              (1 Point)                 [ ]  Section                                             (1 Point)  [ ] Congestive heart failure (in the previous month)  (1 Point)               [ ] Minor surgery                                                  (1 Point)   [ ] Inflammatory bowel disease                             (1 Point)                 [ ] Arthroscopic surgery                                        (2 Points)  [ ] Central venous access                                      (2 Points)                [x ] General surgery lasting more than 45 minutes   (2 Points)       [ ] Stroke (in the previous month)                          (5 Points)               [ ] Elective arthroplasty                                         (5 Points)            [ ] Current or past malignancy                                (2 Points)                                                                                                     HEMATOLOGY RELATED FACTORS                                                 TRAUMA RELATED RISK FACTORS  [ ] Prior episodes of VTE                                     (3 Points)                [ ] Fracture of the hip, pelvis, or leg                       (5 Points)  [ ] Positive family history for VTE                         (3 Points)                 [ ] Acute spinal cord injury (in the previous month)  (5 Points)  [ ] Prothrombin 57452 A                                     (3 Points)                 [ ] Paralysis  (less than 1 month)                             (5 Points)  [ ] Factor V Leiden                                             (3 Points)                  [ ] Multiple Trauma within 1 month                        (5 Points)  [ ] Lupus anticoagulants                                     (3 Points)                                                           [ ] Anticardiolipin antibodies                               (3 Points)                                                       [ ] High homocysteine in the blood                      (3 Points)                                             [ ] Other congenital or acquired thrombophilia      (3 Points)                                                [ ] Heparin induced thrombocytopenia                  (3 Points)                                          Total Score [    4      ]    Risk:  Very low 0   Low 1 to 2   Moderate 3 to 4   High =5       VTE Prophylasix Recommednations:  [x] mechanical pneumatic compression devices                                      [ ] contraindicated: _____________________  [x ] chemo prophylasix                                                                                   [ ] contraindicated _____________________    **** HIGH LIKELIHOOD DVT PRESENT ON ADMISSION  [x ] (please order LE dopplers within 24 hours of admission)

## 2021-01-14 NOTE — PROGRESS NOTE ADULT - ASSESSMENT
ASSESSMENT: s/p endoscopic endonasal transphenoidal resection of recurrent pituitary tumor w/ rasing of nasoseptal flap; no CSF leak  POD 0    NEURO:  Q neuro 1 checks   MRI post-op  Pain control  Activity: [] OOB as tolerated [x] Bedrest [] PT [] OT [] PMNR    PULM:  Pituitary precautions (no incentive spirometry, no straws, no BIPAP)    CV:  -150mmHg, d/c a-line in AM if hemodynamically stable    RENAL:  [place] for strict I+Os  IVF until good PO intake  Drink to thirst    GI:  Diet: Dysphagia screen and then advance diet as tolerated  GI prophylaxis [] not indicated [] PPI [] other:  Bowel regimen [] colace [] senna [] other:    ENDO:   Goal euglycemia (-180)  Pituitary labs  Monitor for DI    HEME/ONC:  VTE prophylaxis: [x] SCDs [] chemoprophylaxis [x] hold chemoprophylaxis due to: fresh post op [] high risk of DVT/PE on admission due to:    ID:  Althea-op antibiotics    MISC:    SOCIAL/FAMILY:  [x] awaiting [] updated at bedside [] family meeting    CODE STATUS:  [x] Full Code [] DNR [] DNI [] Palliative/Comfort Care    DISPOSITION:  [x] ICU [] Stroke Unit [] Floor [] EMU [] RCU [] PCU    [x] Patient is at high risk of neurologic deterioration/death due to: bleeding

## 2021-01-14 NOTE — BRIEF OPERATIVE NOTE - NSICDXBRIEFPROCEDURE_GEN_ALL_CORE_FT
PROCEDURES:  Endoscopic transphenoidal or transnasal resection of pituitary tumor 14-Jan-2021 10:01:00  Reji Hayden

## 2021-01-14 NOTE — PROGRESS NOTE ADULT - ASSESSMENT
transphenoidal pituitary adenoma resection, post-operative day 0  DOI watch  -140mmHg  MRI in AM  Pituitary labs  Pain control  monitor for cerebrospinal fluid leak

## 2021-01-14 NOTE — BRIEF OPERATIVE NOTE - OPERATION/FINDINGS
endoscopic endonasal transphenoidal resection of recurrent pituitary tumor w/ rasing of nasoseptal flap; no CSF leak

## 2021-01-15 LAB
ANION GAP SERPL CALC-SCNC: 13 MMOL/L — SIGNIFICANT CHANGE UP (ref 5–17)
ANION GAP SERPL CALC-SCNC: 7 MMOL/L — SIGNIFICANT CHANGE UP (ref 5–17)
ANION GAP SERPL CALC-SCNC: 8 MMOL/L — SIGNIFICANT CHANGE UP (ref 5–17)
BUN SERPL-MCNC: 11 MG/DL — SIGNIFICANT CHANGE UP (ref 7–23)
BUN SERPL-MCNC: 16 MG/DL — SIGNIFICANT CHANGE UP (ref 7–23)
BUN SERPL-MCNC: 9 MG/DL — SIGNIFICANT CHANGE UP (ref 7–23)
CALCIUM SERPL-MCNC: 7.8 MG/DL — LOW (ref 8.4–10.5)
CALCIUM SERPL-MCNC: 8.8 MG/DL — SIGNIFICANT CHANGE UP (ref 8.4–10.5)
CALCIUM SERPL-MCNC: 9.5 MG/DL — SIGNIFICANT CHANGE UP (ref 8.4–10.5)
CHLORIDE SERPL-SCNC: 107 MMOL/L — SIGNIFICANT CHANGE UP (ref 96–108)
CHLORIDE SERPL-SCNC: 107 MMOL/L — SIGNIFICANT CHANGE UP (ref 96–108)
CHLORIDE SERPL-SCNC: 109 MMOL/L — HIGH (ref 96–108)
CO2 SERPL-SCNC: 21 MMOL/L — LOW (ref 22–31)
CO2 SERPL-SCNC: 22 MMOL/L — SIGNIFICANT CHANGE UP (ref 22–31)
CO2 SERPL-SCNC: 28 MMOL/L — SIGNIFICANT CHANGE UP (ref 22–31)
CORTIS AM PEAK SERPL-MCNC: 29.8 UG/DL — HIGH (ref 6–18.4)
CREAT SERPL-MCNC: 0.51 MG/DL — SIGNIFICANT CHANGE UP (ref 0.5–1.3)
CREAT SERPL-MCNC: 0.6 MG/DL — SIGNIFICANT CHANGE UP (ref 0.5–1.3)
CREAT SERPL-MCNC: 0.89 MG/DL — SIGNIFICANT CHANGE UP (ref 0.5–1.3)
GLUCOSE SERPL-MCNC: 129 MG/DL — HIGH (ref 70–99)
GLUCOSE SERPL-MCNC: 136 MG/DL — HIGH (ref 70–99)
GLUCOSE SERPL-MCNC: 190 MG/DL — HIGH (ref 70–99)
HCT VFR BLD CALC: 34.6 % — SIGNIFICANT CHANGE UP (ref 34.5–45)
HCT VFR BLD CALC: 35 % — SIGNIFICANT CHANGE UP (ref 34.5–45)
HGB BLD-MCNC: 11.2 G/DL — LOW (ref 11.5–15.5)
HGB BLD-MCNC: 11.4 G/DL — LOW (ref 11.5–15.5)
MAGNESIUM SERPL-MCNC: 1.9 MG/DL — SIGNIFICANT CHANGE UP (ref 1.6–2.6)
MAGNESIUM SERPL-MCNC: 2.1 MG/DL — SIGNIFICANT CHANGE UP (ref 1.6–2.6)
MCHC RBC-ENTMCNC: 29.2 PG — SIGNIFICANT CHANGE UP (ref 27–34)
MCHC RBC-ENTMCNC: 29.8 PG — SIGNIFICANT CHANGE UP (ref 27–34)
MCHC RBC-ENTMCNC: 32 GM/DL — SIGNIFICANT CHANGE UP (ref 32–36)
MCHC RBC-ENTMCNC: 32.9 GM/DL — SIGNIFICANT CHANGE UP (ref 32–36)
MCV RBC AUTO: 90.6 FL — SIGNIFICANT CHANGE UP (ref 80–100)
MCV RBC AUTO: 91.1 FL — SIGNIFICANT CHANGE UP (ref 80–100)
NRBC # BLD: 0 /100 WBCS — SIGNIFICANT CHANGE UP (ref 0–0)
NRBC # BLD: 0 /100 WBCS — SIGNIFICANT CHANGE UP (ref 0–0)
PHOSPHATE SERPL-MCNC: 3.3 MG/DL — SIGNIFICANT CHANGE UP (ref 2.5–4.5)
PHOSPHATE SERPL-MCNC: 6.7 MG/DL — HIGH (ref 2.5–4.5)
PLATELET # BLD AUTO: 259 K/UL — SIGNIFICANT CHANGE UP (ref 150–400)
PLATELET # BLD AUTO: 281 K/UL — SIGNIFICANT CHANGE UP (ref 150–400)
POTASSIUM SERPL-MCNC: 3.8 MMOL/L — SIGNIFICANT CHANGE UP (ref 3.5–5.3)
POTASSIUM SERPL-MCNC: 4.2 MMOL/L — SIGNIFICANT CHANGE UP (ref 3.5–5.3)
POTASSIUM SERPL-MCNC: 4.3 MMOL/L — SIGNIFICANT CHANGE UP (ref 3.5–5.3)
POTASSIUM SERPL-SCNC: 3.8 MMOL/L — SIGNIFICANT CHANGE UP (ref 3.5–5.3)
POTASSIUM SERPL-SCNC: 4.2 MMOL/L — SIGNIFICANT CHANGE UP (ref 3.5–5.3)
POTASSIUM SERPL-SCNC: 4.3 MMOL/L — SIGNIFICANT CHANGE UP (ref 3.5–5.3)
RBC # BLD: 3.82 M/UL — SIGNIFICANT CHANGE UP (ref 3.8–5.2)
RBC # BLD: 3.84 M/UL — SIGNIFICANT CHANGE UP (ref 3.8–5.2)
RBC # FLD: 12.4 % — SIGNIFICANT CHANGE UP (ref 10.3–14.5)
RBC # FLD: 12.6 % — SIGNIFICANT CHANGE UP (ref 10.3–14.5)
SODIUM SERPL-SCNC: 137 MMOL/L — SIGNIFICANT CHANGE UP (ref 135–145)
SODIUM SERPL-SCNC: 141 MMOL/L — SIGNIFICANT CHANGE UP (ref 135–145)
SODIUM SERPL-SCNC: 144 MMOL/L — SIGNIFICANT CHANGE UP (ref 135–145)
SP GR UR STRIP: 1 — LOW (ref 1.01–1.02)
WBC # BLD: 13.55 K/UL — HIGH (ref 3.8–10.5)
WBC # BLD: 13.6 K/UL — HIGH (ref 3.8–10.5)
WBC # FLD AUTO: 13.55 K/UL — HIGH (ref 3.8–10.5)
WBC # FLD AUTO: 13.6 K/UL — HIGH (ref 3.8–10.5)

## 2021-01-15 PROCEDURE — 93970 EXTREMITY STUDY: CPT | Mod: 26

## 2021-01-15 PROCEDURE — 99024 POSTOP FOLLOW-UP VISIT: CPT

## 2021-01-15 PROCEDURE — 99233 SBSQ HOSP IP/OBS HIGH 50: CPT

## 2021-01-15 PROCEDURE — 70553 MRI BRAIN STEM W/O & W/DYE: CPT | Mod: 26

## 2021-01-15 RX ORDER — ACETAMINOPHEN 500 MG
1000 TABLET ORAL ONCE
Refills: 0 | Status: COMPLETED | OUTPATIENT
Start: 2021-01-15 | End: 2021-01-15

## 2021-01-15 RX ORDER — ENOXAPARIN SODIUM 100 MG/ML
40 INJECTION SUBCUTANEOUS AT BEDTIME
Refills: 0 | Status: DISCONTINUED | OUTPATIENT
Start: 2021-01-15 | End: 2021-01-17

## 2021-01-15 RX ORDER — CALCIUM GLUCONATE 100 MG/ML
1 VIAL (ML) INTRAVENOUS ONCE
Refills: 0 | Status: COMPLETED | OUTPATIENT
Start: 2021-01-15 | End: 2021-01-15

## 2021-01-15 RX ADMIN — Medication 2 SPRAY(S): at 22:24

## 2021-01-15 RX ADMIN — ENOXAPARIN SODIUM 40 MILLIGRAM(S): 100 INJECTION SUBCUTANEOUS at 22:23

## 2021-01-15 RX ADMIN — Medication 2 SPRAY(S): at 06:00

## 2021-01-15 RX ADMIN — Medication 2 SPRAY(S): at 18:09

## 2021-01-15 RX ADMIN — Medication 400 MILLIGRAM(S): at 14:18

## 2021-01-15 RX ADMIN — Medication 100 GRAM(S): at 07:09

## 2021-01-15 RX ADMIN — Medication 650 MILLIGRAM(S): at 23:08

## 2021-01-15 RX ADMIN — Medication 2 SPRAY(S): at 00:40

## 2021-01-15 RX ADMIN — SIMVASTATIN 20 MILLIGRAM(S): 20 TABLET, FILM COATED ORAL at 22:23

## 2021-01-15 RX ADMIN — Medication 250 MILLIGRAM(S): at 06:00

## 2021-01-15 NOTE — PHYSICAL THERAPY INITIAL EVALUATION ADULT - ADDITIONAL COMMENTS
pt states she lives alone but plans on staying with her sister in a split level home with 4 steps to enter (+handrail) and 4 steps inside (+handrail). Pt states prior to admission being independent with all functional mobility and ADLs. pt states she was working as an  prior to admission.

## 2021-01-15 NOTE — PROGRESS NOTE ADULT - ASSESSMENT
ASSESSMENT: s/p endoscopic endonasal transphenoidal resection of recurrent pituitary tumor w/ rasing of nasoseptal flap; no CSF leak  POD 1    NEURO:  Q neuro 1 checks   MRI post-op  Pain control  Activity: [] OOB as tolerated [x] Bedrest [] PT [] OT [] PMNR    PULM:  Pituitary precautions (no incentive spirometry, no straws, no BIPAP)    CV:  -150mmHg, d/c a-line in AM if hemodynamically stable    RENAL:  [place] for strict I+Os  IVF until good PO intake  Drink to thirst    GI:  Diet: Dysphagia screen and then advance diet as tolerated  GI prophylaxis [] not indicated [] PPI [] other:  Bowel regimen [] colace [] senna [] other:    ENDO:   Goal euglycemia (-180)  Pituitary labs  Monitor for DI    HEME/ONC:  VTE prophylaxis: [x] SCDs [] chemoprophylaxis [x] hold chemoprophylaxis due to: fresh post op [] high risk of DVT/PE on admission due to:    ID:  Althea-op antibiotics    MISC:    SOCIAL/FAMILY:  [x] awaiting [] updated at bedside [] family meeting    CODE STATUS:  [x] Full Code [] DNR [] DNI [] Palliative/Comfort Care    DISPOSITION:  [x] ICU [] Stroke Unit [] Floor [] EMU [] RCU [] PCU    [x] Patient is at high risk of neurologic deterioration/death due to: bleeding      ASSESSMENT: s/p endoscopic endonasal transphenoidal resection of recurrent pituitary tumor w/ rasing of nasoseptal flap; no CSF leak  POD 1    NEURO:  Q neuro 1 checks   MRI post-op  Pain control  Activity: [] OOB as tolerated [x] Bedrest [] PT [] OT [] PMNR    PULM:  Pituitary precautions (no incentive spirometry, no straws, no BIPAP)    CV:  -150mmHg, d/c a-line in AM if hemodynamically stable    RENAL:  [place] for strict I+Os  IVF until good PO intake  Drink to thirst    GI:  Diet: Dysphagia screen and then advance diet as tolerated  GI prophylaxis [] not indicated [] PPI [] other:  Bowel regimen [] colace [] senna [] other:    ENDO:   Goal euglycemia (-180)  Pituitary labs  Monitor for DI    HEME/ONC:  VTE prophylaxis: [x] SCDs [] chemoprophylaxis [x] hold chemoprophylaxis due to: fresh post op [] high risk of DVT/PE on admission due to:    ID:  Althea-op antibiotics    MISC:    SOCIAL/FAMILY:  [x] awaiting [] updated at bedside [] family meeting    CODE STATUS:  [x] Full Code [] DNR [] DNI [] Palliative/Comfort Care

## 2021-01-15 NOTE — OCCUPATIONAL THERAPY INITIAL EVALUATION ADULT - PERTINENT HX OF CURRENT PROBLEM, REHAB EVAL
60 y.o. female with h/o pituitary adenoma s/p removal (sublabial approach) in 2013, who was doing fine for several years but recently has developed persistent frontal headaches, repeat MRI on 09/06/20 revealed recurrent pituitary tumor; she was also positive for Covid 19 on the same day, no other symptoms POD#1 from TRSP with small leak, nasoseptal flap and nasopore in place

## 2021-01-15 NOTE — OCCUPATIONAL THERAPY INITIAL EVALUATION ADULT - NS ASR FOLLOW COMMAND OT EVAL
Regarding: [place]- migraines   ----- Message from Tarah Elmore sent at 11/14/2020  3:12 PM CST -----  Patient Name: Compa France    Full Name of Provider seen for current symptoms: Dr. Eva Sandoval    Pregnant (If Yes, how long?): No    Symptoms: Migraines for x1 week. Negative for COVID. Pt requesting something to help.     Do you or any of your household members have the following symptoms:  Fever >100.0#F or >38.0#C: No    New or worsening cough, shortness of breath, sore throat, congestion, or runny nose: No    New onset of nausea, vomiting or diarrhea: No    New onset of loss of taste or smell, chills, repeated shaking with chills, muscle pain, or headache: Yes    Have you, a household member, or another person you have been in contact with tested positive for COVID-19 in the last 14 days?: No    Call Back #: [phone]     Call Center Account #: [id number]    Which State are you currently located in? (enter State name in Summary field): [place]    Please update the Demographics section with the patients permanent resident address     Emergent COVID-19 Symptoms requiring Nurse Triage:  Trouble breathing, Persistent pain or pressure in the chest, New confusion, Inability to wake or stay awake, Bluish lips or face       100% of the time/able to follow multistep instructions

## 2021-01-15 NOTE — OCCUPATIONAL THERAPY INITIAL EVALUATION ADULT - ADDITIONAL COMMENTS
Pt reports that she lives alone but will be going to her sister upon discharge, PH, no lisa, split level within, 4 steps to negotiate. Bathroom with tub. PTA pt was Independent ion all adl's and mobility. Works as

## 2021-01-15 NOTE — PHYSICAL THERAPY INITIAL EVALUATION ADULT - DISCHARGE DISPOSITION, PT EVAL
Home with no skilled PT needs. Pt presents at baseline/previous level of function. Pt to be discharge from skilled PT services at this time, pt to be left on ambulation aide program to maintain independence. Pt aware and in agreement with discharge recommendation. Please reconsult as appropriate/if status changes.

## 2021-01-15 NOTE — PROGRESS NOTE ADULT - ASSESSMENT
POD#1 from [facility] with small leak, nasoseptal flap and nasopore in place, Pt doing well, slight h/a, relieved with pain meds.

## 2021-01-15 NOTE — PHYSICAL THERAPY INITIAL EVALUATION ADULT - GENERAL OBSERVATIONS, REHAB EVAL
Pt medhat 30 min eval well. pt s/p transphenoidal resection of pituitary tumor. pt rec'd at [facility] being supervised by RN Nataly to ambulate to bathroom. pt agreed to PT eval.

## 2021-01-15 NOTE — PHYSICAL THERAPY INITIAL EVALUATION ADULT - PERTINENT HX OF CURRENT PROBLEM, REHAB EVAL
61 y/o F with h/o pituitary adenoma s/p removal (sublabial approach) in 2013, who was doing fine for several years but recently has developed persistent frontal headaches, repeat MRI on 09/06/20 revealed recurrent pituitary tumor; she was also positive for Covid 19 on the same day, no other symptoms. Pt now presents s/p endoscopic transphenoidal resection of pituitary tumor on 1/14/21. DUPLEX BLE (-)

## 2021-01-16 DIAGNOSIS — E78.5 HYPERLIPIDEMIA, UNSPECIFIED: ICD-10-CM

## 2021-01-16 DIAGNOSIS — E23.2 DIABETES INSIPIDUS: ICD-10-CM

## 2021-01-16 LAB
ANION GAP SERPL CALC-SCNC: 7 MMOL/L — SIGNIFICANT CHANGE UP (ref 5–17)
ANION GAP SERPL CALC-SCNC: 8 MMOL/L — SIGNIFICANT CHANGE UP (ref 5–17)
ANION GAP SERPL CALC-SCNC: 9 MMOL/L — SIGNIFICANT CHANGE UP (ref 5–17)
BUN SERPL-MCNC: 14 MG/DL — SIGNIFICANT CHANGE UP (ref 7–23)
BUN SERPL-MCNC: 15 MG/DL — SIGNIFICANT CHANGE UP (ref 7–23)
BUN SERPL-MCNC: 15 MG/DL — SIGNIFICANT CHANGE UP (ref 7–23)
CALCIUM SERPL-MCNC: 8.9 MG/DL — SIGNIFICANT CHANGE UP (ref 8.4–10.5)
CALCIUM SERPL-MCNC: 9.1 MG/DL — SIGNIFICANT CHANGE UP (ref 8.4–10.5)
CALCIUM SERPL-MCNC: 9.2 MG/DL — SIGNIFICANT CHANGE UP (ref 8.4–10.5)
CHLORIDE SERPL-SCNC: 105 MMOL/L — SIGNIFICANT CHANGE UP (ref 96–108)
CHLORIDE SERPL-SCNC: 106 MMOL/L — SIGNIFICANT CHANGE UP (ref 96–108)
CHLORIDE SERPL-SCNC: 109 MMOL/L — HIGH (ref 96–108)
CO2 SERPL-SCNC: 27 MMOL/L — SIGNIFICANT CHANGE UP (ref 22–31)
CO2 SERPL-SCNC: 28 MMOL/L — SIGNIFICANT CHANGE UP (ref 22–31)
CO2 SERPL-SCNC: 29 MMOL/L — SIGNIFICANT CHANGE UP (ref 22–31)
CORTIS AM PEAK SERPL-MCNC: 11.4 UG/DL — SIGNIFICANT CHANGE UP (ref 6–18.4)
CREAT SERPL-MCNC: 0.62 MG/DL — SIGNIFICANT CHANGE UP (ref 0.5–1.3)
CREAT SERPL-MCNC: 0.66 MG/DL — SIGNIFICANT CHANGE UP (ref 0.5–1.3)
CREAT SERPL-MCNC: 0.72 MG/DL — SIGNIFICANT CHANGE UP (ref 0.5–1.3)
GLUCOSE SERPL-MCNC: 100 MG/DL — HIGH (ref 70–99)
GLUCOSE SERPL-MCNC: 102 MG/DL — HIGH (ref 70–99)
GLUCOSE SERPL-MCNC: 88 MG/DL — SIGNIFICANT CHANGE UP (ref 70–99)
HCT VFR BLD CALC: 31.9 % — LOW (ref 34.5–45)
HGB BLD-MCNC: 10.2 G/DL — LOW (ref 11.5–15.5)
MCHC RBC-ENTMCNC: 29.3 PG — SIGNIFICANT CHANGE UP (ref 27–34)
MCHC RBC-ENTMCNC: 32 GM/DL — SIGNIFICANT CHANGE UP (ref 32–36)
MCV RBC AUTO: 91.7 FL — SIGNIFICANT CHANGE UP (ref 80–100)
NRBC # BLD: 0 /100 WBCS — SIGNIFICANT CHANGE UP (ref 0–0)
OSMOLALITY UR: 154 MOS/KG — LOW (ref 300–900)
PLATELET # BLD AUTO: 239 K/UL — SIGNIFICANT CHANGE UP (ref 150–400)
POTASSIUM SERPL-MCNC: 3.9 MMOL/L — SIGNIFICANT CHANGE UP (ref 3.5–5.3)
POTASSIUM SERPL-MCNC: 4 MMOL/L — SIGNIFICANT CHANGE UP (ref 3.5–5.3)
POTASSIUM SERPL-MCNC: 4.1 MMOL/L — SIGNIFICANT CHANGE UP (ref 3.5–5.3)
POTASSIUM SERPL-SCNC: 3.9 MMOL/L — SIGNIFICANT CHANGE UP (ref 3.5–5.3)
POTASSIUM SERPL-SCNC: 4 MMOL/L — SIGNIFICANT CHANGE UP (ref 3.5–5.3)
POTASSIUM SERPL-SCNC: 4.1 MMOL/L — SIGNIFICANT CHANGE UP (ref 3.5–5.3)
RBC # BLD: 3.48 M/UL — LOW (ref 3.8–5.2)
RBC # FLD: 12.9 % — SIGNIFICANT CHANGE UP (ref 10.3–14.5)
SODIUM SERPL-SCNC: 140 MMOL/L — SIGNIFICANT CHANGE UP (ref 135–145)
SODIUM SERPL-SCNC: 143 MMOL/L — SIGNIFICANT CHANGE UP (ref 135–145)
SODIUM SERPL-SCNC: 145 MMOL/L — SIGNIFICANT CHANGE UP (ref 135–145)
TSH SERPL-MCNC: 0.53 UIU/ML — SIGNIFICANT CHANGE UP (ref 0.27–4.2)
WBC # BLD: 8.36 K/UL — SIGNIFICANT CHANGE UP (ref 3.8–10.5)
WBC # FLD AUTO: 8.36 K/UL — SIGNIFICANT CHANGE UP (ref 3.8–10.5)

## 2021-01-16 PROCEDURE — 99233 SBSQ HOSP IP/OBS HIGH 50: CPT | Mod: 25

## 2021-01-16 PROCEDURE — 99223 1ST HOSP IP/OBS HIGH 75: CPT | Mod: GC

## 2021-01-16 RX ORDER — SENNA PLUS 8.6 MG/1
1 TABLET ORAL ONCE
Refills: 0 | Status: COMPLETED | OUTPATIENT
Start: 2021-01-16 | End: 2021-01-17

## 2021-01-16 RX ORDER — POLYETHYLENE GLYCOL 3350 17 G/17G
17 POWDER, FOR SOLUTION ORAL
Refills: 0 | Status: DISCONTINUED | OUTPATIENT
Start: 2021-01-16 | End: 2021-01-17

## 2021-01-16 RX ORDER — GLYCERIN ADULT
1 SUPPOSITORY, RECTAL RECTAL ONCE
Refills: 0 | Status: COMPLETED | OUTPATIENT
Start: 2021-01-16 | End: 2021-01-16

## 2021-01-16 RX ORDER — DESMOPRESSIN ACETATE 0.1 MG/1
1 TABLET ORAL ONCE
Refills: 0 | Status: COMPLETED | OUTPATIENT
Start: 2021-01-16 | End: 2021-01-16

## 2021-01-16 RX ADMIN — ENOXAPARIN SODIUM 40 MILLIGRAM(S): 100 INJECTION SUBCUTANEOUS at 22:41

## 2021-01-16 RX ADMIN — Medication 2 SPRAY(S): at 06:27

## 2021-01-16 RX ADMIN — DESMOPRESSIN ACETATE 1 MICROGRAM(S): 0.1 TABLET ORAL at 01:59

## 2021-01-16 RX ADMIN — Medication 2 SPRAY(S): at 22:43

## 2021-01-16 RX ADMIN — POLYETHYLENE GLYCOL 3350 17 GRAM(S): 17 POWDER, FOR SOLUTION ORAL at 17:19

## 2021-01-16 RX ADMIN — Medication 650 MILLIGRAM(S): at 17:19

## 2021-01-16 RX ADMIN — Medication 2 SPRAY(S): at 17:19

## 2021-01-16 RX ADMIN — Medication 1 SUPPOSITORY(S): at 22:42

## 2021-01-16 RX ADMIN — Medication 2 SPRAY(S): at 12:00

## 2021-01-16 RX ADMIN — SIMVASTATIN 20 MILLIGRAM(S): 20 TABLET, FILM COATED ORAL at 22:44

## 2021-01-16 NOTE — PROGRESS NOTE ADULT - ASSESSMENT
Post-op day # 2  s/p endoscopic endonasal transphenoidal resection of recurrent pituitary tumor w/ rasing of nasoseptal flap; no CSF leak      NEURO:  Q neuro 4 checks   Pain control  DI watch  Activity: [x] OOB as tolerated [] Bedrest [] PT [] OT [] PMNR    PULM:  Pituitary precautions (no incentive spirometry, no straws, no BIPAP)    CV:  -150mmHg    RENAL:  Voiding  IVl  Drink to thirst    GI:  Diet: As tolerated  GI prophylaxis [X] not indicated [] PPI [] other:  Bowel regimen [x] Miralax [x] senna [] other:    ENDO:   Goal euglycemia (-180)  Pituitary labs  Monitor for DI  Endocrine consult    HEME/ONC:  VTE prophylaxis: [x] SCDs [x] chemoprophylaxis [] hold chemoprophylaxis    ID: Afebrile     MISC:    CODE STATUS:  [x] Full Code [] DNR [] DNI [] Palliative/Comfort Care        Spectra link [id number]

## 2021-01-16 NOTE — CONSULT NOTE ADULT - SUBJECTIVE AND OBJECTIVE BOX
HPI:  60 y.o. 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 she was also positive for Covid 19 on the same day, no other symptoms. S/p Endoscopic Transphenoidal Removal on 1/14 (POD day 2)    Pt states she was diagnosed with pituitary mass 7 years ago in 2013 at [place] (picked up on MRI for back pain). States she was "good" for 7 years afterwards but was never recommended to come in for follow up and or have repeat MRI by surgeon per pt. Was not told mass was hormone secreting. Was never on hormone replacement therapy. Started developing intermittent headaches a/w vomiting for the past ~ 6 months so went to new physician and had MRI brain revealing recurrent pituitary macroadenoma on 09/06/20. Was referred to Dr. Christopher (endocrinology). Pt unsure if she had pre-op hormonal work up or what the results were but states blood work was done and was not told of anything.   She is post-menopausal. Denies diabetes, weight gain, proximal muscle weakness, OP. Denies weight loss, feeling racy/shaky, increased BMs. Does admit to occasional hot flashes.  Denies galactorrhea. Denies changes in hat/ring/shoe size.   Currently reports increased thirst, urination yesterday evening and overnight. Received dose of DDAVP 1mcg ~2AM and urination has been normal since per pt. Otherwise feels ok. Denies visual symptoms pre or post op. Still has headache, unchanged.     PAST MEDICAL & SURGICAL HISTORY:  Chronic neck pain    COVID-19 virus infection  09/2020 - headaches only, CT scan showed piruitary adenoma recurrence    Hyperlipidemia    Pituitary adenoma  2013, recurrence 2020    H/O cosmetic plastic surgery  blepharoplasty, face lift, 07/2020    History of pituitary adenoma  excision - sulabial approach, 2013        FAMILY HISTORY:      Social History:    Outpatient Medications: Home Medications:  simvastatin 20 mg oral tablet: 1 tab(s) orally once a day (at bedtime) (14 Jan 2021 06:00)      MEDICATIONS  (STANDING):  enoxaparin Injectable 40 milliGRAM(s) SubCutaneous at bedtime  simvastatin 20 milliGRAM(s) Oral at bedtime  sodium chloride 0.65% Nasal 2 Spray(s) Both Nostrils four times a day    MEDICATIONS  (PRN):  acetaminophen   Tablet .. 650 milliGRAM(s) Oral every 6 hours PRN Temp greater or equal to 38C (100.4F), Mild Pain (1 - 3)  ondansetron Injectable 4 milliGRAM(s) IV Push every 6 hours PRN Nausea and/or Vomiting      Allergies    penicillins (Hives)  sulfa drugs (Hives)    Intolerances      Review of Systems:  Constitutional: No fever, chills   Neuro: No tremors, headache   Cardiovascular: No chest pain, palpitations  Respiratory: No SOB, no cough  GI: No nausea, vomiting, abdominal pain  : No dysuria, polyuria   Skin: no rash, ulcers   Psych: no depression, anxiety   Endocrine: no polyphagia, polydipsia     ALL OTHER SYSTEMS REVIEWED AND NEGATIVE        PHYSICAL EXAM:  VITALS: T(C): 36.6 (01-16-21 @ 07:49)  T(F): 97.9 (01-16-21 @ 07:49), Max: 98.2 (01-15-21 @ 23:44)  HR: 86 (01-16-21 @ 07:49) (86 - 111)  BP: 135/86 (01-16-21 @ 07:49) (110/60 - 145/96)  RR:  (15 - 18)  SpO2:  (97% - 100%)  Wt(kg): --  I&O:   01-15-21 @ 07:01  -  01-16-21 @ 07:00  --------------------------------------------------------  IN: 2080 mL / OUT: 3300 mL / NET: -1220 mL    01-16-21 @ 07:01  -  01-16-21 @ 12:24  --------------------------------------------------------  IN: 240 mL / OUT: 550 mL / NET: -310 mL      GENERAL: NAD, well-groomed, well-developed  EYES: No proptosis, anicteric  HEENT:  Atraumatic, Normocephalic, moist mucous membranes  THYROID: Normal size, no palpable nodules  RESPIRATORY: Clear to auscultation bilaterally; No rales, rhonchi, wheezing  CARDIOVASCULAR: Regular rate and rhythm; No murmurs  GI: Soft, nontender, non distended, normal bowel sounds  SKIN: Dry, intact, No rashes or lesions  NEURO: AOx3, moves all extremities spontaneously   PSYCH: Reactive affect, euthymic mood                              10.2   8.36  )-----------( 239      ( 16 Jan 2021 06:40 )             31.9       01-16    145  |  109<H>  |  15  ----------------------------<  100<H>  3.9   |  29  |  0.66    EGFR if : 110  EGFR if non : 95    Ca    8.9      01-16  Mg     2.1     01-15  Phos  3.3     01-15        Thyroid Function Tests:              Radiology:       PROCEDURE DATE:  12/29/2020           INTERPRETATION:  Clinical indications: Pituitary lesion.    MRI of the sella turcica was performed using coronal and sagittal T1 and T2-weighted sequence. Axial T1 T2 T2 FLAIR and diffusion-weighted sequences performed through the brain. The patient was injected with approximately 5 cc of Gadavist IV with 5 cc of contrast discarded. Coronal and sagittal T1-weighted sequence performed through sella turcica. Axial T1-weighted sequence performed through the brain.    This exam is compared prior CT scan performed on December 29, 2020.    There is evidence of a large heterogeneous enhancing soft tissue lesion involving the sella/suprasellar region. This lesion cannot be  from the pituitary gland and is likely compatible with a large pituitary macroadenoma. Encasement of the adjacent left internal carotid artery is identified. This lesion does appear to abut the right internal carotid artery. Mass effect on the opticchiasm and inferior portion of the third ventricle is identified. This lesion measures approximately 3.5 x 3.6 x 3.3 cm. There is scalloping of the adjacent clivus identified. Involvement of both sphenoid and posterior ethmoid sinuses are identified.    Evaluation the rest of brain parenchyma appears unremarkable.    Evaluation of the diffusion weighted sequence demonstrates no abnormal areas of restricted diffusion to suggest acute infarct.    Both mastoid and middle ear regions appear clear.    IMPRESSION: Large pituitary macroadenoma as described above.             HPI:  60 y.o. 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 she was also positive for Covid 19 on the same day, no other symptoms. S/p Endoscopic Transphenoidal Removal on 1/14 (POD day 2)    Pt states she was diagnosed with pituitary mass 7 years ago in 2013 at [place] (picked up on MRI for back pain). States she was "good" for 7 years afterwards but was never recommended to come in for follow up and or have repeat MRI by surgeon per pt. Was not told mass was hormone secreting. Was never on hormone replacement therapy. Started developing intermittent headaches a/w vomiting for the past ~ 6 months so went to new physician and had MRI brain revealing recurrent pituitary macroadenoma on 09/06/20. Was referred to Dr. Christopher (endocrinology). Pt had pre-op hormonal work up done - reviewed in [employer], hormonal levels appropriate.   She is post-menopausal. Denies diabetes, weight gain, proximal muscle weakness, OP. Denies weight loss, feeling racy/shaky, increased BMs. Does admit to occasional hot flashes.  Denies galactorrhea. Denies changes in hat/ring/shoe size.   Currently reports increased thirst, urination yesterday evening and overnight. Received dose of DDAVP 1mcg ~2AM and urination has been normal since per pt. Otherwise feels ok. Denies visual symptoms pre or post op. Still has headache, unchanged.     PAST MEDICAL & SURGICAL HISTORY:  Chronic neck pain    COVID-19 virus infection  09/2020 - headaches only, CT scan showed piruitary adenoma recurrence    Hyperlipidemia    Pituitary adenoma  2013, recurrence 2020    H/O cosmetic plastic surgery  blepharoplasty, face lift, 07/2020    History of pituitary adenoma  excision - sulabial approach, 2013        FAMILY HISTORY:      Social History:    Outpatient Medications: Home Medications:  simvastatin 20 mg oral tablet: 1 tab(s) orally once a day (at bedtime) (14 Jan 2021 06:00)      MEDICATIONS  (STANDING):  enoxaparin Injectable 40 milliGRAM(s) SubCutaneous at bedtime  simvastatin 20 milliGRAM(s) Oral at bedtime  sodium chloride 0.65% Nasal 2 Spray(s) Both Nostrils four times a day    MEDICATIONS  (PRN):  acetaminophen   Tablet .. 650 milliGRAM(s) Oral every 6 hours PRN Temp greater or equal to 38C (100.4F), Mild Pain (1 - 3)  ondansetron Injectable 4 milliGRAM(s) IV Push every 6 hours PRN Nausea and/or Vomiting      Allergies    penicillins (Hives)  sulfa drugs (Hives)    Intolerances      Review of Systems:  Constitutional: No fever, chills   Neuro: No tremors, headache   Cardiovascular: No chest pain, palpitations  Respiratory: No SOB, no cough  GI: No nausea, vomiting, abdominal pain  : No dysuria, polyuria   Skin: no rash, ulcers   Psych: no depression, anxiety   Endocrine: no polyphagia, polydipsia     ALL OTHER SYSTEMS REVIEWED AND NEGATIVE        PHYSICAL EXAM:  VITALS: T(C): 36.6 (01-16-21 @ 07:49)  T(F): 97.9 (01-16-21 @ 07:49), Max: 98.2 (01-15-21 @ 23:44)  HR: 86 (01-16-21 @ 07:49) (86 - 111)  BP: 135/86 (01-16-21 @ 07:49) (110/60 - 145/96)  RR:  (15 - 18)  SpO2:  (97% - 100%)  Wt(kg): --  I&O:   01-15-21 @ 07:01  -  01-16-21 @ 07:00  --------------------------------------------------------  IN: 2080 mL / OUT: 3300 mL / NET: -1220 mL    01-16-21 @ 07:01  -  01-16-21 @ 12:24  --------------------------------------------------------  IN: 240 mL / OUT: 550 mL / NET: -310 mL      GENERAL: NAD, well-groomed, well-developed  EYES: No proptosis, anicteric  HEENT:  Atraumatic, Normocephalic, moist mucous membranes  THYROID: Normal size, no palpable nodules  RESPIRATORY: Clear to auscultation bilaterally; No rales, rhonchi, wheezing  CARDIOVASCULAR: Regular rate and rhythm; No murmurs  GI: Soft, nontender, non distended, normal bowel sounds  SKIN: Dry, intact, No rashes or lesions  NEURO: AOx3, moves all extremities spontaneously   PSYCH: Reactive affect, euthymic mood                              10.2   8.36  )-----------( 239      ( 16 Jan 2021 06:40 )             31.9       01-16    145  |  109<H>  |  15  ----------------------------<  100<H>  3.9   |  29  |  0.66    EGFR if : 110  EGFR if non : 95    Ca    8.9      01-16  Mg     2.1     01-15  Phos  3.3     01-15        Thyroid Function Tests:              Radiology:       PROCEDURE DATE:  12/29/2020           INTERPRETATION:  Clinical indications: Pituitary lesion.    MRI of the sella turcica was performed using coronal and sagittal T1 and T2-weighted sequence. Axial T1 T2 T2 FLAIR and diffusion-weighted sequences performed through the brain. The patient was injected with approximately 5 cc of Gadavist IV with 5 cc of contrast discarded. Coronal and sagittal T1-weighted sequence performed through sella turcica. Axial T1-weighted sequence performed through the brain.    This exam is compared prior CT scan performed on December 29, 2020.    There is evidence of a large heterogeneous enhancing soft tissue lesion involving the sella/suprasellar region. This lesion cannot be  from the pituitary gland and is likely compatible with a large pituitary macroadenoma. Encasement of the adjacent left internal carotid artery is identified. This lesion does appear to abut the right internal carotid artery. Mass effect on the opticchiasm and inferior portion of the third ventricle is identified. This lesion measures approximately 3.5 x 3.6 x 3.3 cm. There is scalloping of the adjacent clivus identified. Involvement of both sphenoid and posterior ethmoid sinuses are identified.    Evaluation the rest of brain parenchyma appears unremarkable.    Evaluation of the diffusion weighted sequence demonstrates no abnormal areas of restricted diffusion to suggest acute infarct.    Both mastoid and middle ear regions appear clear.    IMPRESSION: Large pituitary macroadenoma as described above.             HPI:  60 y.o. 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 she was also positive for Covid 19 on the same day, no other symptoms. S/p Endoscopic Transphenoidal Removal on 1/14 (POD day 2)    Pt states she was diagnosed with pituitary mass 7 years ago in 2013 at [place] (picked up on MRI for back pain). States she was "good" for 7 years afterwards but was never recommended to come in for follow up and or have repeat MRI by surgeon per pt. Was not told mass was hormone secreting. Was never on hormone replacement therapy. Started developing intermittent headaches a/w vomiting for the past ~ 6 months so went to new physician and had MRI brain revealing recurrent pituitary macroadenoma on 09/06/20. Was referred to Dr. Christopher (endocrinology). Pt had pre-op hormonal work up done - reviewed in [employer], hormonal levels appropriate.   She is post-menopausal. Denies diabetes, weight gain, proximal muscle weakness, OP. Denies weight loss, feeling racy/shaky, increased BMs. Does admit to occasional hot flashes.  Denies galactorrhea. Denies changes in hat/ring/shoe size.   Currently reports increased thirst, urination yesterday evening and overnight. Received dose of DDAVP 1mcg ~2AM and urination has been normal since per pt. Otherwise feels ok. Denies visual symptoms pre or post op. Still has headache, unchanged.     PAST MEDICAL & SURGICAL HISTORY:  Chronic neck pain    COVID-19 virus infection  09/2020 - headaches only, CT scan showed piruitary adenoma recurrence    Hyperlipidemia    Pituitary adenoma  2013, recurrence 2020    H/O cosmetic plastic surgery  blepharoplasty, face lift, 07/2020    History of pituitary adenoma  excision - sulabial approach, 2013        FAMILY HISTORY:  family hx of brain cancer in father        Social History:  former smoker  no alcohol abuse    Outpatient Medications: Home Medications:  simvastatin 20 mg oral tablet: 1 tab(s) orally once a day (at bedtime) (14 Jan 2021 06:00)      MEDICATIONS  (STANDING):  enoxaparin Injectable 40 milliGRAM(s) SubCutaneous at bedtime  simvastatin 20 milliGRAM(s) Oral at bedtime  sodium chloride 0.65% Nasal 2 Spray(s) Both Nostrils four times a day    MEDICATIONS  (PRN):  acetaminophen   Tablet .. 650 milliGRAM(s) Oral every 6 hours PRN Temp greater or equal to 38C (100.4F), Mild Pain (1 - 3)  ondansetron Injectable 4 milliGRAM(s) IV Push every 6 hours PRN Nausea and/or Vomiting      Allergies    penicillins (Hives)  sulfa drugs (Hives)    Intolerances      Review of Systems:  Constitutional: No fever, chills   Neuro: No tremors, headache   Cardiovascular: No chest pain, palpitations  Respiratory: No SOB, no cough  GI: No nausea, vomiting, abdominal pain  : No dysuria, polyuria   Skin: no rash, ulcers   Psych: no depression, anxiety   Endocrine: no polyphagia, polydipsia     ALL OTHER SYSTEMS REVIEWED AND NEGATIVE        PHYSICAL EXAM:  VITALS: T(C): 36.6 (01-16-21 @ 07:49)  T(F): 97.9 (01-16-21 @ 07:49), Max: 98.2 (01-15-21 @ 23:44)  HR: 86 (01-16-21 @ 07:49) (86 - 111)  BP: 135/86 (01-16-21 @ 07:49) (110/60 - 145/96)  RR:  (15 - 18)  SpO2:  (97% - 100%)  Wt(kg): --  I&O:   01-15-21 @ 07:01  -  01-16-21 @ 07:00  --------------------------------------------------------  IN: 2080 mL / OUT: 3300 mL / NET: -1220 mL    01-16-21 @ 07:01  -  01-16-21 @ 12:24  --------------------------------------------------------  IN: 240 mL / OUT: 550 mL / NET: -310 mL      GENERAL: NAD, well-groomed, well-developed  EYES: No proptosis, anicteric  HEENT:  Atraumatic, Normocephalic, moist mucous membranes  THYROID: Normal size, no palpable nodules  RESPIRATORY: Clear to auscultation bilaterally; No rales, rhonchi, wheezing  CARDIOVASCULAR: Regular rate and rhythm; No murmurs  GI: Soft, nontender, non distended, normal bowel sounds  SKIN: Dry, intact, No rashes or lesions  NEURO: AOx3, moves all extremities spontaneously   PSYCH: Reactive affect, euthymic mood                              10.2   8.36  )-----------( 239      ( 16 Jan 2021 06:40 )             31.9       01-16    145  |  109<H>  |  15  ----------------------------<  100<H>  3.9   |  29  |  0.66    EGFR if : 110  EGFR if non : 95    Ca    8.9      01-16  Mg     2.1     01-15  Phos  3.3     01-15        Thyroid Function Tests:              Radiology:       PROCEDURE DATE:  12/29/2020           INTERPRETATION:  Clinical indications: Pituitary lesion.    MRI of the sella turcica was performed using coronal and sagittal T1 and T2-weighted sequence. Axial T1 T2 T2 FLAIR and diffusion-weighted sequences performed through the brain. The patient was injected with approximately 5 cc of Gadavist IV with 5 cc of contrast discarded. Coronal and sagittal T1-weighted sequence performed through sella turcica. Axial T1-weighted sequence performed through the brain.    This exam is compared prior CT scan performed on December 29, 2020.    There is evidence of a large heterogeneous enhancing soft tissue lesion involving the sella/suprasellar region. This lesion cannot be  from the pituitary gland and is likely compatible with a large pituitary macroadenoma. Encasement of the adjacent left internal carotid artery is identified. This lesion does appear to abut the right internal carotid artery. Mass effect on the opticchiasm and inferior portion of the third ventricle is identified. This lesion measures approximately 3.5 x 3.6 x 3.3 cm. There is scalloping of the adjacent clivus identified. Involvement of both sphenoid and posterior ethmoid sinuses are identified.    Evaluation the rest of brain parenchyma appears unremarkable.    Evaluation of the diffusion weighted sequence demonstrates no abnormal areas of restricted diffusion to suggest acute infarct.    Both mastoid and middle ear regions appear clear.    IMPRESSION: Large pituitary macroadenoma as described above.

## 2021-01-16 NOTE — PROGRESS NOTE ADULT - ASSESSMENT
POD#2 from [facility] with small leak, nasoseptal flap and nasopore in place, Pt doing well, slight h/a, relieved with pain meds.

## 2021-01-16 NOTE — CONSULT NOTE ADULT - ATTENDING COMMENTS
Agree with assessment and plan as above by Dr. Wilkinson. Reviewed all pertinent labs, glucose values, and imaging studies. Modifications made as indicated above. Nonfunctional pituitary macroadenoma with recurrence s/p resection with signs and symptoms of DI s/p DDAVP. Will continue to monitor with prn dosing for now. Trend sodium, monitor urine output and urine osm. Follow-up path. Follow-up free T4 and cortisol level. Outpatient follow-up with Dr. Garcia.      Eben Anders D.O  [phone]

## 2021-01-16 NOTE — CONSULT NOTE ADULT - ASSESSMENT
60 y.o. 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 S/p Endoscopic Transphenoidal Removal on 1/14 (POD day 2), with resultant DI    #Pituitary macroadenoma s/p resection on 1/14 (POD2)  - HPA: 8AM cortisol 29.8 (1/15) --> 11.4 (1/16). Vital signs stable. Repeat AM cortisol tomorrow AM (1/17) given downtrending. If low (<3), start physiologic replacement with hydrocortisone 10mg 8AM and 5mg 3PM. Recheck HPA axis as outpatient.   - Thyroid: please check Free T4 (DW team)   - other pituitary hormones can be checked as outpatient  - treatment of DI as below  - f/u pathology  - obtain records from outpatient Endocrine (Dr. Jones) re: pre-op hormonal work up  DC  - f/u with outpatient Endocrinologist (Dr. Jones) within 1 week of discharge    #Diabetes Insipidus. s/p DDAVP 0.1mcg x 1 on 1/16 ~ 2AM   - Goal Na 140-145  - monitor strict I/O, sodium levels q6hrs (monitor for hypo or hypernatremia)  - if urine output is >250 ml/hr for 2 hours or >500 ml for 1 hour then draw stat bmp with urine omsol. if Na >145 and urine osmol <300, give additional DDAVP 0.1mcg mg po once.    Dotty Wilkinson DO, Endocrine Fellow   Pager [phone]. from 9-5PM. After hours and on weekends please call [phone].       60 y.o. 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 S/p Endoscopic Transphenoidal Removal on 1/14 (POD day 2), with resultant DI    #Pituitary macroadenoma s/p resection on 1/14 (POD2)  - HPA: 8AM cortisol 29.8 (1/15) --> 11.4 (1/16). Vital signs stable. Repeat AM cortisol tomorrow AM (1/17) given downtrending. If low (<3), start physiologic replacement with hydrocortisone 10mg 8AM and 5mg 3PM. Recheck HPA axis as outpatient.   - Thyroid: please check Free T4 (DW team)   - other pituitary hormones can be checked as outpatient  - treatment of DI as below  - f/u pathology  - obtain records from outpatient Endocrine (Dr. Jones) re: pre-op hormonal work up  DC  - f/u with outpatient Endocrinologist (Dr. Jones) within 1 week of discharge    #Diabetes Insipidus. s/p DDAVP 0.1mcg x 1 on 1/16 ~ 2AM   - Goal Na 140-145  - monitor strict I/O, sodium levels q6hrs (monitor for hypo or hypernatremia)  - if urine output is >250 ml/hr for 2 hours or >500 ml for 1 hour then draw stat bmp with urine omsol. if Na >145 and urine osmol <300, give additional DDAVP 0.1mcg mg po once.    #HLD- c/w statin    Dotty Wilkinson DO, Endocrine Fellow   Pager [phone]. from 9-5PM. After hours and on weekends please call [phone].       60 y.o. 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 S/p Endoscopic Transphenoidal Removal on 1/14 (POD day 2), with resultant DI    #Pituitary macroadenoma s/p resection on 1/14 (POD2)  - HPA: 8AM cortisol 29.8 (1/15) --> 11.4 (1/16). Vital signs stable. Repeat AM cortisol tomorrow AM (1/17) given downtrending. If low (<3), start physiologic replacement with hydrocortisone 10mg 8AM and 5mg 3PM. Recheck HPA axis as outpatient.   - Thyroid: please check Free T4 (DW team)   - other pituitary hormones can be checked as outpatient  - treatment of DI as below  - f/u pathology  - obtain records from outpatient Endocrine (Dr. Jones) re: pre-op hormonal work up  DC  - f/u with outpatient Endocrinologist (Dr. Jones) within 1 week of discharge    #Diabetes Insipidus. s/p DDAVP 0.1mcg x 1 on 1/16 ~ 2AM   - Goal Na 140-145  - monitor strict I/O, sodium levels q6hrs (monitor for hypo or hypernatremia)  - if urine output is >250 ml/hr for 2 hours or >500 ml for 1 hour then draw stat bmp with urine omsol. if Na >145 and urine osmol <300, give additional DDAVP 0.1mcg mg po once.    #HLD- c/w statin    Dotty Wilkinson DO, Endocrine Fellow   Pager [phone]. from 9-5PM. After hours and on weekends please call [phone].       60 y.o. 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 S/p Endoscopic Transphenoidal Removal on 1/14 (POD day 2), with resultant DI    #Pituitary macroadenoma s/p resection on 1/14 (POD2)  - HPA: 8AM cortisol 29.8 (1/15) --> 11.4 (1/16). Vital signs stable. Repeat AM cortisol tomorrow AM (1/17) given downtrending. If low (<3), start physiologic replacement with hydrocortisone 10mg 8AM and 5mg 3PM. Recheck HPA axis as outpatient.   - Thyroid: please check Free T4 (DW team)   - other pituitary hormones can be checked as outpatient  - treatment of DI as below  - f/u pathology  - obtain records from outpatient Endocrine (Dr. Jones) re: pre-op hormonal work up  DC  - f/u with outpatient Endocrinologist (Dr. Jones) within 1-2 weeks of discharge    #Diabetes Insipidus. s/p DDAVP 0.1mcg x 1 on 1/16 ~ 2AM   - Goal Na 140-145  - monitor strict I/O, sodium levels q6hrs (monitor for hypo or hypernatremia)  - if urine output is >250 ml/hr for 2 hours or >500 ml for 1 hour then draw stat bmp with urine omsol. if Na >145 and urine osmol <300, give additional DDAVP 0.1mcg mg po once.    #HLD- c/w statin    Dotty Wilkinson DO, Endocrine Fellow   Pager [phone]. from 9-5PM. After hours and on weekends please call [phone].

## 2021-01-17 ENCOUNTER — TRANSCRIPTION ENCOUNTER (OUTPATIENT)
Age: 61
End: 2021-01-17

## 2021-01-17 VITALS
TEMPERATURE: 97 F | HEART RATE: 78 BPM | OXYGEN SATURATION: 98 % | DIASTOLIC BLOOD PRESSURE: 80 MMHG | SYSTOLIC BLOOD PRESSURE: 121 MMHG | RESPIRATION RATE: 18 BRPM

## 2021-01-17 LAB
ANION GAP SERPL CALC-SCNC: 9 MMOL/L — SIGNIFICANT CHANGE UP (ref 5–17)
BUN SERPL-MCNC: 13 MG/DL — SIGNIFICANT CHANGE UP (ref 7–23)
CALCIUM SERPL-MCNC: 9.2 MG/DL — SIGNIFICANT CHANGE UP (ref 8.4–10.5)
CHLORIDE SERPL-SCNC: 105 MMOL/L — SIGNIFICANT CHANGE UP (ref 96–108)
CO2 SERPL-SCNC: 26 MMOL/L — SIGNIFICANT CHANGE UP (ref 22–31)
CORTIS AM PEAK SERPL-MCNC: 21.5 UG/DL — HIGH (ref 6–18.4)
CREAT SERPL-MCNC: 0.62 MG/DL — SIGNIFICANT CHANGE UP (ref 0.5–1.3)
GLUCOSE SERPL-MCNC: 90 MG/DL — SIGNIFICANT CHANGE UP (ref 70–99)
LMWH PPP CHRO-ACNC: 0.03 IU/ML — LOW (ref 0.5–1.1)
POTASSIUM SERPL-MCNC: 3.9 MMOL/L — SIGNIFICANT CHANGE UP (ref 3.5–5.3)
POTASSIUM SERPL-SCNC: 3.9 MMOL/L — SIGNIFICANT CHANGE UP (ref 3.5–5.3)
SODIUM SERPL-SCNC: 140 MMOL/L — SIGNIFICANT CHANGE UP (ref 135–145)
T4 FREE SERPL-MCNC: 0.8 NG/DL — LOW (ref 0.9–1.8)

## 2021-01-17 PROCEDURE — 99233 SBSQ HOSP IP/OBS HIGH 50: CPT | Mod: 25

## 2021-01-17 PROCEDURE — 80048 BASIC METABOLIC PNL TOTAL CA: CPT

## 2021-01-17 PROCEDURE — A9585: CPT

## 2021-01-17 PROCEDURE — 85027 COMPLETE CBC AUTOMATED: CPT

## 2021-01-17 PROCEDURE — 84443 ASSAY THYROID STIM HORMONE: CPT

## 2021-01-17 PROCEDURE — 85520 HEPARIN ASSAY: CPT

## 2021-01-17 PROCEDURE — 97161 PT EVAL LOW COMPLEX 20 MIN: CPT

## 2021-01-17 PROCEDURE — 81005 URINALYSIS: CPT

## 2021-01-17 PROCEDURE — 83935 ASSAY OF URINE OSMOLALITY: CPT

## 2021-01-17 PROCEDURE — 88342 IMHCHEM/IMCYTCHM 1ST ANTB: CPT

## 2021-01-17 PROCEDURE — C9399: CPT

## 2021-01-17 PROCEDURE — 82533 TOTAL CORTISOL: CPT

## 2021-01-17 PROCEDURE — 88360 TUMOR IMMUNOHISTOCHEM/MANUAL: CPT

## 2021-01-17 PROCEDURE — 84100 ASSAY OF PHOSPHORUS: CPT

## 2021-01-17 PROCEDURE — 84439 ASSAY OF FREE THYROXINE: CPT

## 2021-01-17 PROCEDURE — 97166 OT EVAL MOD COMPLEX 45 MIN: CPT

## 2021-01-17 PROCEDURE — 70553 MRI BRAIN STEM W/O & W/DYE: CPT

## 2021-01-17 PROCEDURE — 88305 TISSUE EXAM BY PATHOLOGIST: CPT

## 2021-01-17 PROCEDURE — 88341 IMHCHEM/IMCYTCHM EA ADD ANTB: CPT

## 2021-01-17 PROCEDURE — C1889: CPT

## 2021-01-17 PROCEDURE — 83735 ASSAY OF MAGNESIUM: CPT

## 2021-01-17 PROCEDURE — 93970 EXTREMITY STUDY: CPT

## 2021-01-17 RX ORDER — SODIUM CHLORIDE 0.65 %
2 AEROSOL, SPRAY (ML) NASAL
Qty: 0 | Refills: 0 | DISCHARGE
Start: 2021-01-17

## 2021-01-17 RX ORDER — ACETAMINOPHEN 500 MG
3 TABLET ORAL
Qty: 0 | Refills: 0 | DISCHARGE
Start: 2021-01-17

## 2021-01-17 RX ADMIN — Medication 2 SPRAY(S): at 05:05

## 2021-01-17 RX ADMIN — SENNA PLUS 1 TABLET(S): 8.6 TABLET ORAL at 05:03

## 2021-01-17 RX ADMIN — POLYETHYLENE GLYCOL 3350 17 GRAM(S): 17 POWDER, FOR SOLUTION ORAL at 05:04

## 2021-01-17 RX ADMIN — Medication 650 MILLIGRAM(S): at 05:03

## 2021-01-17 NOTE — DISCHARGE NOTE PROVIDER - NSDCCPCAREPLAN_GEN_ALL_CORE_FT
PRINCIPAL DISCHARGE DIAGNOSIS  Diagnosis: Pituitary adenoma  Assessment and Plan of Treatment: 1/14/21 underwent Transphenoidal Resection of Recurrent Pituitary mass (no intra op CSF leak) Required 1 dose of DDAVP for DI symptoms on 1/15/21.      SECONDARY DISCHARGE DIAGNOSES  Diagnosis: Hyperlipidemia, unspecified hyperlipidemia type  Assessment and Plan of Treatment: Continue statin

## 2021-01-17 NOTE — PROGRESS NOTE ADULT - PROBLEM SELECTOR PLAN 1
- continue humidified face tent  - nasal saline, 2 sprays to b/l nares 4 times a day.  - monitor for signs of Epistaxis and CSF leak  - avoid nasal trauma, heavy lifting and bending at waist.  - Care a/p neurosurgery.
- continue humidified face tent  - nasal saline, 2 sprays to b/l nares 4 times a day.  - monitor for signs of Epistaxis and CSF leak  - avoid nasal trauma, heavy lifting and bending at waist.  - Care a/p neurosurgery

## 2021-01-17 NOTE — DISCHARGE NOTE PROVIDER - HOSPITAL COURSE
60 y.o. female with h/o pituitary adenoma s/p removal (sublabial approach) in 2013, who was doing fine for several years but recently has developed persistent frontal headaches, repeat MRI on 09/06/20 revealed recurrent pituitary tumor; she was also positive for Covid 19 on the same day, no other symptoms. She is scheduled for Endoscopic Transphenoidal Removal Pituitary Tumor.    1/14 s/p Transphenoidal Resection of Recurrent Pituitary mass (no CSF leak). Patient had one episode of DI requiring 1 dose of DDAVP on 1/15/21. Am cortisol was wnl. She was followed by Endocrinology. Post op MRI completed showing post op changes. On day of discharge patient is medically and neurologically stable.

## 2021-01-17 NOTE — DISCHARGE NOTE PROVIDER - CARE PROVIDERS DIRECT ADDRESSES
[email or web address]

## 2021-01-17 NOTE — DISCHARGE NOTE PROVIDER - PROVIDER TOKENS
PROVIDER:[TOKEN:[[id number]],FOLLOWUP:[1 week]],PROVIDER:[TOKEN:[[id number]],FOLLOWUP:[1 week]],PROVIDER:[TOKEN:[[id number]],FOLLOWUP:[1 week]]

## 2021-01-17 NOTE — DISCHARGE NOTE PROVIDER - CARE PROVIDER_API CALL
Roosevelt Hernandez)  Neurosurgery  [address], [address]  [place] [address]  Phone: [phone]  Fax: [phone]  Follow Up Time: 1 week    Palma Kirkpatrick)  Otolaryngology  [address], [address]  [place] [address]  Phone: [phone]  Fax: [phone]  Follow Up Time: 1 week    Gabrielle Jones)  EndocrinologyMetabDiabetes; Internal Medicine  [address], [address] [place] [address]  Phone: [phone]  Fax: [phone]  Follow Up Time: 1 week

## 2021-01-17 NOTE — PROGRESS NOTE ADULT - SUBJECTIVE AND OBJECTIVE BOX
HPI:  60 y.o. female with h/o pituitary adenoma s/p removal (sublabial approach) in 2013, who was doing fine for several years but recently has developed persistent frontal headaches, repeat MRI on 09/06/20 revealed recurrent pituitary tumor; she was also positive for Covid 19 on the same day, no other symptoms. She is scheduled for Endoscopic Transphenoidal Removal Pituitary Tumor.  (04 Jan 2021 11:16)        Surgery: endoscopic endonasal transphenoidal resection of recurrent pituitary tumor w/ rasing of nasoseptal flap; no CSF leak    ICU Vital Signs Last 24 Hrs  T(C): 36.4 (14 Jan 2021 10:25), Max: 36.8 (14 Jan 2021 05:58)  T(F): 97.5 (14 Jan 2021 10:25), Max: 98.2 (14 Jan 2021 05:58)  HR: 80 (14 Jan 2021 14:00) (71 - 81)  BP: 126/70 (14 Jan 2021 13:30) (114/74 - 138/76)  BP(mean): 92 (14 Jan 2021 13:30) (88 - 103)  ABP: 140/66 (14 Jan 2021 14:00) (139/67 - 153/77)  ABP(mean): 98 (14 Jan 2021 14:00) (97 - 110)  RR: 14 (14 Jan 2021 14:00) (14 - 18)  SpO2: 96% (14 Jan 2021 14:00) (96% - 100%)    01-14-21 @ 07:01  -  01-14-21 @ 14:34  --------------------------------------------------------  IN: 335 mL / OUT: 330 mL / NET: 5 mL      acetaminophen   Tablet .. 650 milliGRAM(s) Oral every 6 hours PRN  HYDROmorphone  Injectable 0.25 milliGRAM(s) IV Push every 10 minutes PRN  ondansetron Injectable 4 milliGRAM(s) IV Push every 6 hours PRN  ondansetron Injectable 4 milliGRAM(s) IV Push once PRN  simvastatin 20 milliGRAM(s) Oral at bedtime  sodium chloride 0.65% Nasal 2 Spray(s) Both Nostrils four times a day  sodium chloride 0.9% with potassium chloride 20 mEq/L 1000 milliLiter(s) (75 mL/Hr) IV Continuous <Continuous>  vancomycin  IVPB 1000 milliGRAM(s) IV Intermittent every 12 hours    LABS:  Na: 143 (01-14 @ 13:52)  K: 3.5 (01-14 @ 13:52)  Cl: 108 (01-14 @ 13:52)  CO2: 21 (01-14 @ 13:52)  BUN: 12 (01-14 @ 13:52)  Cr: 0.49 (01-14 @ 13:52)  Glu: 119(01-14 @ 13:52)    General: No acute distress  HEENT: Anicteric sclerae  Cardiac: [id number]  Lungs: Clear  Abdomen: Soft, non-tender, +BS  Extremities: No c/c/e  Skin/Incision Site: Clean, dry and intact  Neurologic: Awake, alert, fully oriented, follows commands, PERRL, VFFtc, EOMI, face symmetric, tongue midline, no drift, full strength
   60 y.o. female with h/o pituitary adenoma s/p removal (sublabial approach) in 2013, who was doing fine for several years but recently has developed persistent frontal headaches, repeat MRI on 09/06/20 revealed recurrent pituitary tumor; she was also positive for Covid 19 on the same day, no other symptoms. She is scheduled for Endoscopic Transphenoidal Removal Pituitary Tumor.  (04 Jan 2021 11:16)    Endoscopic transphenoidal or transnasal resection of pituitary tumor    Post-op day # 1 s/p TSP for Pituitary adenoma    Overnight event: patient required DDAVP for increasing urinary output    Vital Signs Last 24 Hrs  T(C): 36.6 (16 Jan 2021 07:49), Max: 36.8 (15 Jaya 2021 23:44)  T(F): 97.9 (16 Jan 2021 07:49), Max: 98.2 (15 Jaya 2021 23:44)  HR: 86 (16 Jan 2021 07:49) (86 - 111)  BP: 135/86 (16 Jan 2021 07:49) (110/60 - 145/96)  BP(mean): 81 (15 Jaya 2021 14:00) (81 - 90)  RR: 18 (16 Jan 2021 07:49) (15 - 18)  SpO2: 97% (16 Jan 2021 07:49) (97% - 100%)                          10.2   8.36  )-----------( 239      ( 16 Jan 2021 06:40 )             31.9    01-16    145  |  109<H>  |  15  ----------------------------<  100<H>  3.9   |  29  |  0.66    Ca    8.9      16 Jan 2021 06:36  Phos  3.3     01-15  Mg     2.1     01-15       Stroke Core Measures      DRAIN OUTPUT:     NEUROIMAGING:   < from: MR Head w/wo IV Cont      Status post 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 650 milliGRAM(s) Oral every 6 hours PRN Temp greater or equal to 38C (100.4F), Mild Pain (1 - 3)  ondansetron Injectable 4 milliGRAM(s) IV Push every 6 hours PRN Nausea and/or Vomiting    Anticoagulation:  enoxaparin Injectable 40 milliGRAM(s) SubCutaneous at bedtime    Cardiology:    Endo:   simvastatin 20 milliGRAM(s) Oral at bedtime    Pulm:    GI/:    Other:  sodium chloride 0.65% Nasal 2 Spray(s) Both Nostrils four times a day  
HPI:  60 y.o. female with h/o pituitary adenoma s/p removal (sublabial approach) in 2013, who was doing fine for several years but recently has developed persistent frontal headaches, repeat MRI on 09/06/20 revealed recurrent pituitary tumor; she was also positive for Covid 19 on the same day, no other symptoms. She is scheduled for Endoscopic Transphenoidal Removal Pituitary Tumor.  (04 Jan 2021 11:16)        Surgery: endoscopic endonasal transphenoidal resection of recurrent pituitary tumor w/ rasing of nasoseptal flap; no CSF leak      General: No acute distress  HEENT: Anicteric sclerae  Cardiac: [id number]  Lungs: Clear  Abdomen: Soft, non-tender, +BS  Extremities: No c/c/e  Skin/Incision Site: Clean, dry and intact  Neurologic: Awake, alert, fully oriented, follows commands, PERRL, VFFtc, EOMI, face symmetric, tongue midline, no drift, full strength
status post transphenoidal pituitary adenoma resection for recurrent pituitary mass, post-operative day 0    awake, alert, fully oriented, follows, no drift, full strength 
ENT ISSUE/POD: TSRP    HPI: POD#0 from TRSP with small leak, nasoseptal flap and nasopore in place, Pt doing well, slight h/a, relieved with pain meds. Pt denies any bleeding, no leaking, no salty or metallic taste in mouth, no PND.       PAST MEDICAL & SURGICAL HISTORY:  Chronic neck pain    COVID-19 virus infection  09/2020 - headaches only, CT scan showed piruitary adenoma recurrence    Hyperlipidemia    Pituitary adenoma  2013, recurrence 2020    H/O cosmetic plastic surgery  blepharoplasty, face lift, 07/2020    History of pituitary adenoma  excision - sulabial approach, 2013      Allergies    penicillins (Hives)  sulfa drugs (Hives)    Intolerances      MEDICATIONS  (STANDING):  simvastatin 20 milliGRAM(s) Oral at bedtime  sodium chloride 0.65% Nasal 2 Spray(s) Both Nostrils four times a day  sodium chloride 0.9% with potassium chloride 20 mEq/L 1000 milliLiter(s) (75 mL/Hr) IV Continuous <Continuous>  vancomycin  IVPB 1000 milliGRAM(s) IV Intermittent every 12 hours    MEDICATIONS  (PRN):  acetaminophen   Tablet .. 650 milliGRAM(s) Oral every 6 hours PRN Temp greater or equal to 38C (100.4F), Mild Pain (1 - 3)  HYDROmorphone  Injectable 0.25 milliGRAM(s) IV Push every 10 minutes PRN Moderate Pain (4 - 6)  ondansetron Injectable 4 milliGRAM(s) IV Push every 6 hours PRN Nausea and/or Vomiting  ondansetron Injectable 4 milliGRAM(s) IV Push once PRN Nausea and/or Vomiting      social history: see consult     family history: see consult     ROS:   ENT: all negative except as noted in HPI   Pulm: denies SOB, cough, hemoptysis  Neuro: denies numbness/tingling, loss of sensation  Endo: denies heat/cold intolerance, excessive sweating      Vital Signs Last 24 Hrs  T(C): 37.5 (14 Jan 2021 18:00), Max: 37.5 (14 Jan 2021 18:00)  T(F): 99.5 (14 Jan 2021 18:00), Max: 99.5 (14 Jan 2021 18:00)  HR: 90 (14 Jan 2021 18:00) (71 - 91)  BP: 118/62 (14 Jan 2021 18:00) (114/74 - 138/76)  BP(mean): 85 (14 Jan 2021 18:00) (85 - 103)  RR: 16 (14 Jan 2021 18:00) (14 - 18)  SpO2: 98% (14 Jan 2021 18:00) (96% - 100%)     01-14    143  |  108  |  12  ----------------------------<  119<H>  3.5   |  21<L>  |  0.49<L>    Ca    7.6<L>      14 Jan 2021 13:52         PHYSICAL EXAM:  Gen: NAD  Skin: No rashes, bruises, or lesions  Head: Normocephalic, Atraumatic  Face: no edema, erythema, or fluctuance. Parotid glands soft without mass  Eyes: no scleral injection  Nose: Nares bilaterally dry blood, min serosang on mustache   Mouth: No Stridor / Drooling / Trismus.  Mucosa moist, tongue/uvula midline, oropharynx clear  Neck: Flat, supple, no lymphadenopathy, trachea midline, no masses  Lymphatic: No lymphadenopathy  Resp: breathing easily, no stridor  Neuro: facial nerve intact, no facial droop        
SUBJECTIVE: Pt seen and examined, doing well, some serosanguinous drainage noted on moustache dressing. Patient reports no salty taste. She reports intermittent right sided headache which she takes tylenol for.    OVERNIGHT EVENTS: none    Vital Signs Last 24 Hrs  T(C): 36.7 (17 Jan 2021 08:18), Max: 36.8 (16 Jan 2021 13:11)  T(F): 98.1 (17 Jan 2021 08:18), Max: 98.3 (16 Jan 2021 13:11)  HR: 69 (17 Jan 2021 08:18) (69 - 82)  BP: 123/78 (17 Jan 2021 08:18) (121/80 - 142/80)  BP(mean): --  RR: 18 (17 Jan 2021 08:18) (18 - 18)  SpO2: 97% (17 Jan 2021 08:18) (96% - 97%)    PHYSICAL EXAM:    General: No Acute Distress     Neurological: Awake, alert oriented to person, place and time, Following Commands, PERRL, EOMI, Face Symmetrical, Speech Fluent, Moving all extremities, Muscle Strength normal in all four extremities, No Drift, Sensation to Light Touch Intact    Pulmonary: Clear to Auscultation, No Rales, No Rhonchi, No Wheezes     Cardiovascular: S1, S2, Regular Rate and Rhythm     Gastrointestinal: Soft, Nontender, Nondistended     Incision: none    LABS:                        10.2   8.36  )-----------( 239      ( 16 Jan 2021 06:40 )             31.9    01-17    140  |  105  |  13  ----------------------------<  90  3.9   |  26  |  0.62    Ca    9.2      17 Jan 2021 06:06          01-16 @ 07:01  -  01-17 @ 07:00  --------------------------------------------------------  IN: 560 mL / OUT: 1100 mL / NET: -540 mL        MEDICATIONS:  Antibiotics:    Neuro:  acetaminophen   Tablet .. 650 milliGRAM(s) Oral every 6 hours PRN Temp greater or equal to 38C (100.4F), Mild Pain (1 - 3)  ondansetron Injectable 4 milliGRAM(s) IV Push every 6 hours PRN Nausea and/or Vomiting    Cardiac:    Pulm:    GI/:  polyethylene glycol 3350 17 Gram(s) Oral two times a day    Other:   enoxaparin Injectable 40 milliGRAM(s) SubCutaneous at bedtime  simvastatin 20 milliGRAM(s) Oral at bedtime  sodium chloride 0.65% Nasal 2 Spray(s) Both Nostrils four times a day    DIET: [x] Regular [] CCD [] Renal [] Puree [] Dysphagia [] Tube Feeds:     IMAGING:   < from: MR Head w/wo IV Cont (01.15.21 @ 20:12) >  New transsphenoidal postsurgical changes are noted. There has been interval resection of the previously noted large infiltrative mass involving the sella, sphenoid sinus, sphenoid portion of the clivus, and left greater than right cavernous sinuses. Fluid and packing is noted within the operative cavity. Nonspecific band like areas of enhancement are noted at the margins of the surgical cavity and within the left aspect of the cavernous sinus which could reflect evolving postoperative changes, peripheral areas of residual lesion, or a combination of both. Serial imaging follow-up over time is recommended for reevaluation as the packing contracts/resorbs. A normal-appearing pituitary gland is noted within the right aspect of the sella. The pituitary stalk remains deviated towards the right.    There is no evidence for brain parenchymal mass, acute infarct, acute intracranial hemorrhage, or hydrocephalus.    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    < end of copied text >  < from: [facility] Duplex Lower Ext Vein Scan, Bilat (01.15.21 @ 16:04) >    IMPRESSION:  No evidence of deep venous thrombosis in either lower extremity.    < end of copied text >  
ENT ISSUE/POD: S/p TSRP POD1    HPI: POD#1 from TRSP with small leak, nasoseptal flap and nasopore in place, Pt doing well, slight h/a, relieved with pain meds. Pt denies any bleeding, no leaking, no salty or metallic taste in mouth, no PND. Has been using her saline spray.             PAST MEDICAL & SURGICAL HISTORY:  Chronic neck pain    COVID-19 virus infection  09/2020 - headaches only, CT scan showed piruitary adenoma recurrence    Hyperlipidemia    Pituitary adenoma  2013, recurrence 2020    H/O cosmetic plastic surgery  blepharoplasty, face lift, 07/2020    History of pituitary adenoma  excision - sulabial approach, 2013      Allergies    penicillins (Hives)  sulfa drugs (Hives)    Intolerances      MEDICATIONS  (STANDING):  calcium gluconate IVPB 1 Gram(s) IV Intermittent once  simvastatin 20 milliGRAM(s) Oral at bedtime  sodium chloride 0.65% Nasal 2 Spray(s) Both Nostrils four times a day    MEDICATIONS  (PRN):  acetaminophen   Tablet .. 650 milliGRAM(s) Oral every 6 hours PRN Temp greater or equal to 38C (100.4F), Mild Pain (1 - 3)  HYDROmorphone  Injectable 0.25 milliGRAM(s) IV Push every 10 minutes PRN Moderate Pain (4 - 6)  ondansetron Injectable 4 milliGRAM(s) IV Push every 6 hours PRN Nausea and/or Vomiting  ondansetron Injectable 4 milliGRAM(s) IV Push once PRN Nausea and/or Vomiting      ROS:   ENT: all negative except as noted in HPI   Pulm: denies SOB, cough, hemoptysis  Neuro: denies numbness/tingling, loss of sensation  Endo: denies heat/cold intolerance, excessive sweating      Vital Signs Last 24 Hrs  T(C): 36.9 (14 Jan 2021 22:00), Max: 37.5 (14 Jan 2021 18:00)  T(F): 98.4 (14 Jan 2021 22:00), Max: 99.5 (14 Jan 2021 18:00)  HR: 94 (15 Jaya 2021 06:00) (73 - 111)  BP: 116/55 (15 Jaya 2021 06:00) (106/58 - 138/76)  BP(mean): 75 (15 Jaya 2021 06:00) (75 - 103)  RR: 16 (15 Jaya 2021 06:00) (14 - 16)  SpO2: 96% (15 Jaya 2021 06:00) (95% - 100%)                          11.2   13.55 )-----------( 259      ( 15 Jaya 2021 01:44 )             35.0    01-15    141  |  107  |  9   ----------------------------<  129<H>  3.8   |  21<L>  |  0.51    Ca    7.8<L>      15 Jaya 2021 01:44  Phos  6.7     01-15  Mg     1.9     01-15         PHYSICAL EXAM:  Gen: NAD  Skin: No rashes, bruises, or lesions  Head: Normocephalic, Atraumatic  Face: no edema, erythema, or fluctuance. Parotid glands soft without mass  Eyes: no scleral injection  Nose: Nares bilaterally dry blood, min serosang on mustache   Mouth: No Stridor / Drooling / Trismus.  Mucosa moist, tongue/uvula midline, oropharynx clear  Neck: Flat, supple, no lymphadenopathy, trachea midline, no masses  Lymphatic: No lymphadenopathy  Resp: breathing easily, no stridor  Neuro: facial nerve intact, no facial droop            
ENT ISSUE/POD: S/p TSRP POD2    HPI: POD#2 from TRSP with small leak, nasoseptal flap and nasopore in place, Pt doing well, slight h/a, relieved with pain meds. Pt denies any bleeding, no leaking, no salty or metallic taste in mouth, no PND. Has been using her saline spray.         PAST MEDICAL & SURGICAL HISTORY:  Chronic neck pain    COVID-19 virus infection  09/2020 - headaches only, CT scan showed piruitary adenoma recurrence    Hyperlipidemia    Pituitary adenoma  2013, recurrence 2020    H/O cosmetic plastic surgery  blepharoplasty, face lift, 07/2020    History of pituitary adenoma  excision - sulabial approach, 2013      Allergies    penicillins (Hives)  sulfa drugs (Hives)    Intolerances      MEDICATIONS  (STANDING):  enoxaparin Injectable 40 milliGRAM(s) SubCutaneous at bedtime  simvastatin 20 milliGRAM(s) Oral at bedtime  sodium chloride 0.65% Nasal 2 Spray(s) Both Nostrils four times a day    MEDICATIONS  (PRN):  acetaminophen   Tablet .. 650 milliGRAM(s) Oral every 6 hours PRN Temp greater or equal to 38C (100.4F), Mild Pain (1 - 3)  ondansetron Injectable 4 milliGRAM(s) IV Push every 6 hours PRN Nausea and/or Vomiting      Social History: see consult    Family history: see consult    ROS:   ENT: all negative except as noted in HPI   Pulm: denies SOB, cough, hemoptysis  Neuro: denies numbness/tingling, loss of sensation  Endo: denies heat/cold intolerance, excessive sweating      Vital Signs Last 24 Hrs  T(C): 36.8 (16 Jan 2021 04:32), Max: 36.8 (15 Jaya 2021 23:44)  T(F): 98.2 (16 Jan 2021 04:32), Max: 98.2 (15 Jaya 2021 23:44)  HR: 91 (16 Jan 2021 04:32) (86 - 111)  BP: 124/82 (16 Jan 2021 04:32) (110/60 - 145/96)  BP(mean): 81 (15 Jaya 2021 14:00) (78 - 90)  RR: 16 (16 Jan 2021 04:32) (15 - 16)  SpO2: 98% (16 Jan 2021 04:32) (97% - 100%)                          10.2   8.36  )-----------( 239      ( 16 Jan 2021 06:40 )             31.9    01-16    145  |  109<H>  |  15  ----------------------------<  100<H>  3.9   |  29  |  0.66    Ca    8.9      16 Jan 2021 06:36  Phos  3.3     01-15  Mg     2.1     01-15         PHYSICAL EXAM:  Gen: NAD  Skin: No rashes, bruises, or lesions  Head: Normocephalic, Atraumatic  Face: no edema, erythema, or fluctuance. Parotid glands soft without mass  Eyes: no scleral injection  Nose: Nares bilaterally dry blood, min serosang on mustache   Mouth: No Stridor / Drooling / Trismus.  Mucosa moist, tongue/uvula midline, oropharynx clear  Neck: Flat, supple, no lymphadenopathy, trachea midline, no masses  Lymphatic: No lymphadenopathy  Resp: breathing easily, no stridor  Neuro: facial nerve intact, no facial droop        
ENT ISSUE/POD: S/p TSRP POD3    HPI: POD#3 from TRSP with small leak, nasoseptal flap and nasopore in place, Pt doing well, slight h/a, relieved with pain meds. Pt denies any bleeding, no leaking, no salty or metallic taste in mouth, no PND. Has been using her saline spray.       PAST MEDICAL & SURGICAL HISTORY:  Chronic neck pain    COVID-19 virus infection  09/2020 - headaches only, CT scan showed piruitary adenoma recurrence    Hyperlipidemia    Pituitary adenoma  2013, recurrence 2020    H/O cosmetic plastic surgery  blepharoplasty, face lift, 07/2020    History of pituitary adenoma  excision - sulabial approach, 2013      Allergies    penicillins (Hives)  sulfa drugs (Hives)    Intolerances      MEDICATIONS  (STANDING):  enoxaparin Injectable 40 milliGRAM(s) SubCutaneous at bedtime  polyethylene glycol 3350 17 Gram(s) Oral two times a day  simvastatin 20 milliGRAM(s) Oral at bedtime  sodium chloride 0.65% Nasal 2 Spray(s) Both Nostrils four times a day    MEDICATIONS  (PRN):  acetaminophen   Tablet .. 650 milliGRAM(s) Oral every 6 hours PRN Temp greater or equal to 38C (100.4F), Mild Pain (1 - 3)  ondansetron Injectable 4 milliGRAM(s) IV Push every 6 hours PRN Nausea and/or Vomiting      Social History: see consult    Family history: see consult    ROS:   ENT: all negative except as noted in HPI   Pulm: denies SOB, cough, hemoptysis  Neuro: denies numbness/tingling, loss of sensation  Endo: denies heat/cold intolerance, excessive sweating      Vital Signs Last 24 Hrs  T(C): 36.2 (17 Jan 2021 11:42), Max: 36.8 (16 Jan 2021 13:11)  T(F): 97.2 (17 Jan 2021 11:42), Max: 98.3 (16 Jan 2021 13:11)  HR: 78 (17 Jan 2021 11:42) (69 - 82)  BP: 121/80 (17 Jan 2021 11:42) (121/80 - 142/80)  BP(mean): --  RR: 18 (17 Jan 2021 11:42) (18 - 18)  SpO2: 98% (17 Jan 2021 11:42) (96% - 98%)                          10.2   8.36  )-----------( 239      ( 16 Jan 2021 06:40 )             31.9    01-17    140  |  105  |  13  ----------------------------<  90  3.9   |  26  |  0.62    Ca    9.2      17 Jan 2021 06:06         PHYSICAL EXAM:  Gen: NAD  Skin: No rashes, bruises, or lesions  Head: Normocephalic, Atraumatic  Face: no edema, erythema, or fluctuance. Parotid glands soft without mass  Eyes: no scleral injection  Nose: Nares bilaterally dry blood, min serosang on mustache   Mouth: No Stridor / Drooling / Trismus.  Mucosa moist, tongue/uvula midline, oropharynx clear  Neck: Flat, supple, no lymphadenopathy, trachea midline, no masses  Lymphatic: No lymphadenopathy  Resp: breathing easily, no stridor  Neuro: facial nerve intact, no facial droop

## 2021-01-17 NOTE — PROGRESS NOTE ADULT - ASSESSMENT
60 y.o. female with h/o pituitary adenoma s/p removal (sublabial approach) in 2013, who was doing fine for several years but recently has developed persistent frontal headaches, repeat MRI on 09/06/20 revealed recurrent pituitary tumor; she was also positive for Covid 19 on the same day, no other symptoms. She is scheduled for Endoscopic Transphenoidal Removal Pituitary Tumor.    1/14 s/p Transphenoidal Resection of Recurrent Pituitary mass (no CSF leak). Uneventful hospital course      PLAN:  Neuro:   - neuro checks q 4 hrs  - DI watch  - observe for  CSF leak   - f/u am cortisol  - Post op MRI completed- shows post op changes  - tylenol prn pain  - nasal spray prn   Respiratory:   - on room air  CV:  - vitals stable  - on statin  Endocrine:   - f/u am cortisol   - required 1 dose of ddavp on 1/15  - Endocrine following   DVT ppx:   - venodynes, sq lovenox  PT/OT:   -no PT needs      [facility] # [id number]

## 2021-01-17 NOTE — DISCHARGE NOTE NURSING/CASE MANAGEMENT/SOCIAL WORK - PATIENT PORTAL LINK FT
You can access the FollowMyHealth Patient Portal offered by [facility] by registering at the following website: [email or web address]. By joining "[employer]’s FollowMyHealth portal, you will also be able to view your health information using other applications (apps) compatible with our system.

## 2021-01-17 NOTE — DISCHARGE NOTE PROVIDER - NSDCFUADDINST_GEN_ALL_CORE_FT
Return to ER for fever, chills, nausea or vomiting, severe headache or pain not relieved with pain medication, sluggishness or change in mental status, any clear nasal drainage associated with nausea/vomiting.   You may shower and wash your hair.   No driving until cleared by your surgeon. Do not return to work until cleared by surgeon. Do not take advil, aleve or ibuprofen as they can cause bleeding.  Do not blow your nose. Wipe nasal secretions  gently. Do not use straw. do not smoke .Avoid sneezing. If sneeze keep mouth open and dont pinch your nose.  No bending over. Do not strain by  pushing or lifting heavy objects >10 lbs. Chew soft food.

## 2021-01-17 NOTE — DISCHARGE NOTE PROVIDER - NSDCMRMEDTOKEN_GEN_ALL_CORE_FT
acetaminophen 325 mg oral tablet: 3 tab(s) orally every 6 hours, As Needed - 3)  simvastatin 20 mg oral tablet: 1 tab(s) orally once a day (at bedtime)  sodium chloride 0.65% nasal spray: 2 spray(s) in each affected nostril 4 times a day, As Needed for nasal congestion

## 2021-01-17 NOTE — PROGRESS NOTE ADULT - ASSESSMENT
POD#3 from TRSP with small leak, nasoseptal flap and nasopore in place, Pt doing well, slight h/a, relieved with pain meds.

## 2021-01-20 LAB — SURGICAL PATHOLOGY STUDY: SIGNIFICANT CHANGE UP

## 2021-01-22 ENCOUNTER — APPOINTMENT (OUTPATIENT)
Dept: OTOLARYNGOLOGY | Facility: CLINIC | Age: 61
End: 2021-01-22
Payer: COMMERCIAL

## 2021-01-22 ENCOUNTER — APPOINTMENT (OUTPATIENT)
Dept: SPINE | Facility: CLINIC | Age: 61
End: 2021-01-22
Payer: COMMERCIAL

## 2021-01-22 VITALS
SYSTOLIC BLOOD PRESSURE: 119 MMHG | HEART RATE: 82 BPM | BODY MASS INDEX: 20.2 KG/M2 | TEMPERATURE: 97.9 F | OXYGEN SATURATION: 96 % | HEIGHT: 63 IN | WEIGHT: 114 LBS | DIASTOLIC BLOOD PRESSURE: 80 MMHG

## 2021-01-22 VITALS
HEIGHT: 63 IN | WEIGHT: 114 LBS | HEART RATE: 76 BPM | BODY MASS INDEX: 20.2 KG/M2 | SYSTOLIC BLOOD PRESSURE: 129 MMHG | DIASTOLIC BLOOD PRESSURE: 79 MMHG | TEMPERATURE: 97.6 F

## 2021-01-22 LAB — T4 FREE SERPL DIALY-MCNC: 0.87 NG/DL — SIGNIFICANT CHANGE UP

## 2021-01-22 PROCEDURE — 99024 POSTOP FOLLOW-UP VISIT: CPT

## 2021-01-22 PROCEDURE — 31237 NSL/SINS NDSC SURG BX POLYPC: CPT | Mod: 50,58

## 2021-01-22 NOTE — REASON FOR VISIT
[de-identified] : 1/14/2021 [de-identified] : Endoscopic transnasal approach to the sphenoid sinus and sella.  Removal of a pituitary adenoma. [de-identified] : The patient stated that she is doing well and denies having any pain.  She reports having a small amount of pink drainage and is using the nasal saline spray.  The patient is to see Dr. Kirkpatrick today and has a follow up appointment with endocrinologist, Dr. Gabrielle Jones on 01/29/2021.  She denies any headache, changes in vision, excessive thirst, frequent urination or signs of CSF rhinorrhea. [Family Member] : family member

## 2021-01-22 NOTE — CONSULT LETTER
[Dear  ___] : Dear  [unfilled], [Courtesy Letter:] : I had the pleasure of seeing your patient, [unfilled], in my office today. [Please see my note below.] : Please see my note below. [Referral Closing:] : Thank you very much for seeing this patient.  If you have any questions, please do not hesitate to contact me. [Sincerely,] : Sincerely, [FreeTextEntry1] : Kristen Pena\par 1960 [FreeTextEntry2] : Gail Chiang D.O.\par [address] [place] [address].\par [place], [place] [address] [FreeTextEntry3] : ULISES Green.\par Nurse Practitioner\par Neurosurgery and Spine Department\par [facility]\par

## 2021-01-22 NOTE — HISTORY OF PRESENT ILLNESS
[FreeTextEntry1] : KIP KING is a 60 year old lady who underwent what sounds like a sublabial transsphenoidal resection of a pituitary tumor about seven years ago by Dr. Jose Newsome.  She had one postoperative MRI and then did not have any subsequent follow up.  The patient tested positive for COVID in September.  She suffered headaches and an MRI was done which revealed a large recurrence of a pituitary tumor filling the sella, the sphenoid sinus, and extending to the left cavernous sinus.  She underwent an endoscopic transnasal transphenoidal approach to the sphenoid sinus and sella and removal of the tumor on 01/14/2021 with Dr. Roosevelt Hernandez.

## 2021-01-22 NOTE — PHYSICAL EXAM
[FreeTextEntry1] : No evidence of CSF rhinorrhea.  The patient has a small amount of pink drainage from her nostril. [Person] : oriented to person [Place] : oriented to place [Time] : oriented to time [Short Term Intact] : short term memory intact [Remote Intact] : remote memory intact [Span Intact] : the attention span was normal [Concentration Intact] : normal concentrating ability [Fluency] : fluency intact [Comprehension] : comprehension intact [Current Events] : adequate knowledge of current events [Past History] : adequate knowledge of personal past history [Vocabulary] : adequate range of vocabulary [Cranial Nerves Optic (II)] : visual acuity intact bilaterally,  pupils equal round and reactive to light [Cranial Nerves Oculomotor (III)] : extraocular motion intact [Cranial Nerves Trigeminal (V)] : facial sensation intact symmetrically [Cranial Nerves Facial (VII)] : face symmetrical [Cranial Nerves Vestibulocochlear (VIII)] : hearing was intact bilaterally [Cranial Nerves Glossopharyngeal (IX)] : tongue and palate midline [Cranial Nerves Accessory (XI - Cranial And Spinal)] : head turning and shoulder shrug symmetric [Cranial Nerves Hypoglossal (XII)] : there was no tongue deviation with protrusion [Motor Strength] : muscle strength was normal in all four extremities [Motor Tone] : muscle tone was normal in all four extremities [No Muscle Atrophy] : normal bulk in all four extremities [Sensation Tactile Decrease] : light touch was intact [Balance] : balance was intact [Abnormal Walk] : normal gait [Past-pointing] : there was no past-pointing [Tremor] : no tremor present [2+] : Patella left 2+

## 2021-01-22 NOTE — HISTORY OF PRESENT ILLNESS
[de-identified] : S/p TSPR with Dr. Hernandez 1/14/2020 with septal flap\par No bleeding.  Mild nasal congestion.  No clear fluid d/c.  No sense of smell or taste.  Using sinus wash QID.

## 2021-01-22 NOTE — ASSESSMENT
[FreeTextEntry1] : S/p TSPR with Dr. Hernandez 1/14/2020 with Septal flap\par - Debrided in office today\par - Continue sinus wash BID\par - F/U 2-3 weeks.

## 2021-01-22 NOTE — REASON FOR VISIT
[de-identified] : 1/14/2021 [de-identified] : Endoscopic transnasal approach to the sphenoid sinus and sella.  Removal of a pituitary adenoma. [de-identified] : The patient stated that she is doing well and denies having any pain.  She reports having a small amount of pink drainage and is using the nasal saline spray.  The patient is to see Dr. Kirkpatrick today and has a follow up appointment with endocrinologist, Dr. Gabrielle Jones on 01/29/2021.  She denies any headache, changes in vision, excessive thirst, frequent urination or signs of CSF rhinorrhea. [Family Member] : family member

## 2021-01-22 NOTE — PHYSICAL EXAM
[FreeTextEntry1] : No evidence of CSF rhinorrhea.  The patient has a small amount of pink drainage from her nostril. [Person] : oriented to person [Place] : oriented to place [Time] : oriented to time [Short Term Intact] : short term memory intact [Remote Intact] : remote memory intact [Span Intact] : the attention span was normal [Concentration Intact] : normal concentrating ability [Fluency] : fluency intact [Comprehension] : comprehension intact [Current Events] : adequate knowledge of current events [Past History] : adequate knowledge of personal past history [Vocabulary] : adequate range of vocabulary [Cranial Nerves Optic (II)] : visual acuity intact bilaterally,  pupils equal round and reactive to light [Cranial Nerves Oculomotor (III)] : extraocular motion intact [Cranial Nerves Trigeminal (V)] : facial sensation intact symmetrically [Cranial Nerves Facial (VII)] : face symmetrical [Cranial Nerves Vestibulocochlear (VIII)] : hearing was intact bilaterally [Cranial Nerves Glossopharyngeal (IX)] : tongue and palate midline [Cranial Nerves Accessory (XI - Cranial And Spinal)] : head turning and shoulder shrug symmetric [Cranial Nerves Hypoglossal (XII)] : there was no tongue deviation with protrusion [Motor Strength] : muscle strength was normal in all four extremities [Motor Tone] : muscle tone was normal in all four extremities [No Muscle Atrophy] : normal bulk in all four extremities [Sensation Tactile Decrease] : light touch was intact [Abnormal Walk] : normal gait [Balance] : balance was intact [Past-pointing] : there was no past-pointing [Tremor] : no tremor present [2+] : Patella left 2+

## 2021-01-22 NOTE — CONSULT LETTER
[Dear  ___] : Dear  [unfilled], [Courtesy Letter:] : I had the pleasure of seeing your patient, [unfilled], in my office today. [Please see my note below.] : Please see my note below. [Referral Closing:] : Thank you very much for seeing this patient.  If you have any questions, please do not hesitate to contact me. 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

## 2021-01-22 NOTE — PROCEDURE
[FreeTextEntry6] : Rigid scope #1 was used. Right nasal passage with crusting that was debrided with suction. Exposed cartilage noted with gelfoam on it. What suctioned freely was removed. Sphenoidotomy patent with mucoid secretions and dissolving nasopore which were gently suctioned. Left nasal passage with crusting that was debrided with suction. Sphenoidotomy and septectomy with nasopore that was gently suctioned. No evidence of CSF leak. Nasopharynx clear. \par \par

## 2021-01-22 NOTE — PHYSICAL EXAM
[Normal] : normal appearance, well groomed, well nourished, and in no acute distress [de-identified] : crusting

## 2021-01-22 NOTE — ASSESSMENT
[FreeTextEntry1] : Activity levels were discussed.  It was recommended that the patient avoid bending and lifting heavy objects.  She is to continue to use the saline nasal spray.  The patient is to continue her follow up with Dr. Kirkpatrick and Dr. Jones.  She is to return to this office in three weeks for evaluation.  It is planned that she will return in 3-4 months with a new MRI of the brain and sella with and without contrast as the baseline postoperative MRI for review.

## 2021-01-29 ENCOUNTER — APPOINTMENT (OUTPATIENT)
Dept: ENDOCRINOLOGY | Facility: CLINIC | Age: 61
End: 2021-01-29
Payer: COMMERCIAL

## 2021-01-29 VITALS
WEIGHT: 111 LBS | SYSTOLIC BLOOD PRESSURE: 136 MMHG | DIASTOLIC BLOOD PRESSURE: 85 MMHG | HEART RATE: 90 BPM | HEIGHT: 63 IN | RESPIRATION RATE: 16 BRPM | TEMPERATURE: 97.9 F | BODY MASS INDEX: 19.67 KG/M2 | OXYGEN SATURATION: 100 %

## 2021-01-29 PROCEDURE — 99072 ADDL SUPL MATRL&STAF TM PHE: CPT

## 2021-01-29 PROCEDURE — 99214 OFFICE O/P EST MOD 30 MIN: CPT

## 2021-01-29 NOTE — ASSESSMENT
[FreeTextEntry1] : Recurrent invasive pituitary adenoma with mass effect. S/p TSRP\par - repeat pit panel in about 2 weeks\par - daily weight, drink to thirst, monitor urinary output\par - repeat pit MRI in about 3 months\par - f/u eduardo a neuro-ophthalmologist\par - advised on post-operative endocrine complications and a possibility of a permanent panhypopituitarism, especially since she will likely require post-operative radiation therapy.\par RTC post labs, or sooner prn\par

## 2021-01-29 NOTE — HISTORY OF PRESENT ILLNESS
[FreeTextEntry1] : 60 year female  f/u for pituitary evaluation \par \par *** Jan 29, 2021 ***\par \par s/p TSRP on 01/14/21\par Pathology- invasive pituitary adenoma. Immunostains (+) for synaptophysin, Cam 5.2, GH (weak), LH (weak), neg for ACTH, TSH,. Ki67 index- 2-4%.\par Immediately post surgery with frequent urination and a severe thirst, responded well to 1 injection of DDAVP\par post op: cortisol 21.5, Na- 140, Uosm- 154, FT4 - 0.8 with FT4DD- 0.87.\par Discharged on no medications. \par Feels well, denies fatigue, headaches, dizziness, nausea/vomiting, increased thirst or craving for iced cold water. No polyuria. Urinates 2-3 times at night, but has been doing this for years before. \par \par HPI:\par Mrs Pena has been diagnosed with an apparently non-secreting 2 cm pituitary adenoma in 2013, and underwent a TSSR by Dr. Newsome. She was briefly seen by an endocrinologist in [place], and remembers having only 1 follow up pituitary MRI since then. She was doing well over the past several years,  but recently developed persistent headaches, and a repeat MRI was done showing a recurrent pituitary tumor. \par MRI (105/20)-  3.8x2.8x2.8 sellar mass extending suprasellar and into the left cavernous sinus, encasing left internal carotid artery  and left auditory canal (with mass effect on the left 4th, 5th. 6th CN). + Mass effect on the optic chiasm. + mild effect on hypothalamus. \par She complains of some balance issues within past 6 months, but denies excessive fatigue, weight changes, peripheral vision changes ;  cold/heat intolerance ; new purple stretch marks or easy bruising  jaw protruding, change in facial features or change in shoe/hat/ring size ;  dizziness/nausea/vomiting/abdominal pain ; galactorrhea. Also, she denies diaphoresis or sweating, chest pain or palpitations, SOB, diarrhea or constipation, anxiety or tremor, muscle aches\par \par

## 2021-02-03 ENCOUNTER — TRANSCRIPTION ENCOUNTER (OUTPATIENT)
Age: 61
End: 2021-02-03

## 2021-02-05 ENCOUNTER — TRANSCRIPTION ENCOUNTER (OUTPATIENT)
Age: 61
End: 2021-02-05

## 2021-02-10 ENCOUNTER — APPOINTMENT (OUTPATIENT)
Dept: SPINE | Facility: CLINIC | Age: 61
End: 2021-02-10
Payer: COMMERCIAL

## 2021-02-10 ENCOUNTER — APPOINTMENT (OUTPATIENT)
Dept: OTOLARYNGOLOGY | Facility: CLINIC | Age: 61
End: 2021-02-10
Payer: COMMERCIAL

## 2021-02-10 VITALS
HEART RATE: 83 BPM | OXYGEN SATURATION: 94 % | BODY MASS INDEX: 19.49 KG/M2 | WEIGHT: 110 LBS | TEMPERATURE: 98.3 F | SYSTOLIC BLOOD PRESSURE: 112 MMHG | DIASTOLIC BLOOD PRESSURE: 80 MMHG | HEIGHT: 63 IN

## 2021-02-10 VITALS
TEMPERATURE: 98 F | HEIGHT: 63 IN | SYSTOLIC BLOOD PRESSURE: 126 MMHG | DIASTOLIC BLOOD PRESSURE: 81 MMHG | HEART RATE: 87 BPM | BODY MASS INDEX: 19.49 KG/M2 | WEIGHT: 110 LBS

## 2021-02-10 PROCEDURE — 99024 POSTOP FOLLOW-UP VISIT: CPT

## 2021-02-10 PROCEDURE — 31237 NSL/SINS NDSC SURG BX POLYPC: CPT | Mod: 50,58

## 2021-02-10 RX ORDER — SODIUM SULFATE, POTASSIUM SULFATE, MAGNESIUM SULFATE 17.5; 3.13; 1.6 G/ML; G/ML; G/ML
17.5-3.13-1.6 SOLUTION, CONCENTRATE ORAL
Qty: 354 | Refills: 0 | Status: COMPLETED | COMMUNITY
Start: 2020-10-21

## 2021-02-10 NOTE — ASSESSMENT
[FreeTextEntry1] : S/p TSPR with Dr. Hernandez 1/14/2020 with R NS Septal flap:\par - Debrided in office today\par - Continue sinus wash BID\par - F/U 2-3 weeks.

## 2021-02-10 NOTE — HISTORY OF PRESENT ILLNESS
[de-identified] : S/p TSPR with Dr. Hernandez 1/14/2020 with septal flap\par No bleeding.  Pos nasal congestion.  No clear fluid d/c.  No sense of smell or taste.  Using sinus wash 3-4 times a day.

## 2021-02-10 NOTE — PHYSICAL EXAM
[Normal] : normal appearance, well groomed, well nourished, and in no acute distress [de-identified] : crusting on right

## 2021-02-10 NOTE — REASON FOR VISIT
[Subsequent Evaluation] : a subsequent evaluation for [FreeTextEntry2] : S/p TSPR with Dr. Hernandez 1/14/2020

## 2021-02-10 NOTE — PROCEDURE
[FreeTextEntry6] : Flexible scope 26 was used. Right nasal passage with crusting that was debrided with forceps and suction. Exposed cartilage noted, epithelializing circumferentially. What suctioned freely was removed. Sphenoidotomy patent with mucoid secretions and dissolving nasopore which were gently suctioned. Left nasal passage clear. Sphenoidotomy and septectomy with dried bloody crust and nasopore. No evidence of CSF leak. Nasopharynx clear. \par \par

## 2021-02-24 ENCOUNTER — LABORATORY RESULT (OUTPATIENT)
Age: 61
End: 2021-02-24

## 2021-02-26 ENCOUNTER — APPOINTMENT (OUTPATIENT)
Dept: OTOLARYNGOLOGY | Facility: CLINIC | Age: 61
End: 2021-02-26
Payer: COMMERCIAL

## 2021-02-26 VITALS
SYSTOLIC BLOOD PRESSURE: 118 MMHG | HEIGHT: 63 IN | BODY MASS INDEX: 19.49 KG/M2 | DIASTOLIC BLOOD PRESSURE: 72 MMHG | WEIGHT: 110 LBS | HEART RATE: 71 BPM | TEMPERATURE: 98 F

## 2021-02-26 LAB — ACTH SER-ACNC: 15.9 PG/ML

## 2021-02-26 PROCEDURE — 31231 NASAL ENDOSCOPY DX: CPT | Mod: 58

## 2021-02-26 PROCEDURE — 99024 POSTOP FOLLOW-UP VISIT: CPT

## 2021-02-26 NOTE — ASSESSMENT
[FreeTextEntry1] : S/p TSPR with Dr. Hernandez 1/14/2020 with R NS Septal flap:\par - healing well\par - Continue sinus wash BID\par - F/U 2-3 weeks\par - recommend she obtain the COVID vaccine with her history of brain tumor and recent brain surgery

## 2021-02-26 NOTE — PHYSICAL EXAM
[Normal] : normal appearance, well groomed, well nourished, and in no acute distress [de-identified] : crusting on right

## 2021-02-26 NOTE — HISTORY OF PRESENT ILLNESS
[de-identified] : S/p TSPR with Dr. Hernandez for pituitary tumor 1/14/2020 with septal flap\par No bleeding.  Pos nasal congestion on right.  No clear fluid d/c.  Sense of smell or taste are returning.  Using sinus wash 3-4 times a day.

## 2021-02-26 NOTE — PROCEDURE
[FreeTextEntry6] : Flexible scope 24 and Rigid Scope # 8 was used. Right nasal passage with crusting that was debrided with forceps and suction. Exposed cartilage noted, epithelializing circumferentially. Crust is still adherent so left in place. Sphenoidotomy patent and well healed. Left nasal passage clear. Sphenoidotomy and septectomy are mostly clear other than a small crust. No evidence of CSF leak. Nasopharynx clear. \par \par

## 2021-03-08 LAB
ALPHA SUBUNIT SERPL-MCNC: 0.77 NG/ML
MONOMERIC PROLACTIN (ICMA)*: 15 NG/ML
PERCENT MACROPROLACTIN: 6 %
PROLACTIN, SERUM (ICMA)*: 16 NG/ML

## 2021-03-09 ENCOUNTER — APPOINTMENT (OUTPATIENT)
Dept: ENDOCRINOLOGY | Facility: CLINIC | Age: 61
End: 2021-03-09

## 2021-03-17 ENCOUNTER — NON-APPOINTMENT (OUTPATIENT)
Age: 61
End: 2021-03-17

## 2021-03-17 ENCOUNTER — APPOINTMENT (OUTPATIENT)
Dept: ENDOCRINOLOGY | Facility: CLINIC | Age: 61
End: 2021-03-17
Payer: COMMERCIAL

## 2021-03-17 VITALS
BODY MASS INDEX: 19.49 KG/M2 | OXYGEN SATURATION: 96 % | SYSTOLIC BLOOD PRESSURE: 115 MMHG | DIASTOLIC BLOOD PRESSURE: 75 MMHG | HEART RATE: 70 BPM | WEIGHT: 110 LBS | TEMPERATURE: 97.5 F | HEIGHT: 63 IN

## 2021-03-17 PROCEDURE — 99072 ADDL SUPL MATRL&STAF TM PHE: CPT

## 2021-03-17 PROCEDURE — 99214 OFFICE O/P EST MOD 30 MIN: CPT

## 2021-03-17 NOTE — HISTORY OF PRESENT ILLNESS
[FreeTextEntry1] : 60 year female  f/u for pituitary evaluation \par \par *** Mar 17, 2021 ***\par \par feels well. no nasal bleeding, but still with some nasal discharge. seeing Dr. Kirkpatrick\par denies fatigue, headaches, dizziness, nausea/vomiting, increased thirst or craving for iced cold water. No polyuria. \par see labs attached\par FT4- 0.9 with FT4DD- 0.82\par \par \par *** Jan 29, 2021 ***\par \par s/p TSRP on 01/14/21\par Pathology- invasive pituitary adenoma. Immunostains (+) for synaptophysin, Cam 5.2, GH (weak), LH (weak), neg for ACTH, TSH,. Ki67 index- 2-4%.\par Immediately post surgery with frequent urination and a severe thirst, responded well to 1 injection of DDAVP\par post op: cortisol 21.5, Na- 140, Uosm- 154, FT4 - 0.8 with FT4DD- 0.87.\par Discharged on no medications. \par Feels well, denies fatigue, headaches, dizziness, nausea/vomiting, increased thirst or craving for iced cold water. No polyuria. Urinates 2-3 times at night, but has been doing this for years before. \par \par HPI:\par Mrs Pena has been diagnosed with an apparently non-secreting 2 cm pituitary adenoma in 2013, and underwent a TSSR by Dr. Newsome. She was briefly seen by an endocrinologist in [place], and remembers having only 1 follow up pituitary MRI since then. She was doing well over the past several years,  but recently developed persistent headaches, and a repeat MRI was done showing a recurrent pituitary tumor. \par MRI (105/20)-  3.8x2.8x2.8 sellar mass extending suprasellar and into the left cavernous sinus, encasing left internal carotid artery  and left auditory canal (with mass effect on the left 4th, 5th. 6th CN). + Mass effect on the optic chiasm. + mild effect on hypothalamus. \par She complains of some balance issues within past 6 months, but denies excessive fatigue, weight changes, peripheral vision changes ;  cold/heat intolerance ; new purple stretch marks or easy bruising  jaw protruding, change in facial features or change in shoe/hat/ring size ;  dizziness/nausea/vomiting/abdominal pain ; galactorrhea. Also, she denies diaphoresis or sweating, chest pain or palpitations, SOB, diarrhea or constipation, anxiety or tremor, muscle aches\par \par

## 2021-03-17 NOTE — ASSESSMENT
[FreeTextEntry1] : Recurrent invasive pituitary adenoma with mass effect. S/p TSRP\par - pit axis appears intact at present, but FT4 is on downgoing trend. Will cont monitoring\par - daily weight, drink to thirst, monitor urinary output\par - repeat pit MRI in April and f/u with Dr. Hernandez after. F/u with a neuro-ophthalmologist (Dr. Valle ) as scheduled- next planned for 08/21\par - advised on post-operative endocrine complications and a possibility of a permanent panhypopituitarism, especially since she will likely require post-operative radiation therapy.\par RTC 3 mos, labs prior\par

## 2021-03-19 ENCOUNTER — APPOINTMENT (OUTPATIENT)
Dept: OTOLARYNGOLOGY | Facility: CLINIC | Age: 61
End: 2021-03-19
Payer: COMMERCIAL

## 2021-03-19 VITALS
DIASTOLIC BLOOD PRESSURE: 82 MMHG | HEART RATE: 86 BPM | WEIGHT: 110 LBS | SYSTOLIC BLOOD PRESSURE: 122 MMHG | TEMPERATURE: 97.6 F | HEIGHT: 63 IN | BODY MASS INDEX: 19.49 KG/M2

## 2021-03-19 PROCEDURE — 31237 NSL/SINS NDSC SURG BX POLYPC: CPT | Mod: 58,RT

## 2021-03-19 PROCEDURE — 99024 POSTOP FOLLOW-UP VISIT: CPT

## 2021-03-19 NOTE — PHYSICAL EXAM
[Normal] : normal appearance, well groomed, well nourished, and in no acute distress [de-identified] : crusting on right

## 2021-03-19 NOTE — ASSESSMENT
[FreeTextEntry1] : S/p TSPR with Dr. Hernandez 1/14/2020 with R NS Septal flap:\par - Debrided in office today on right\par - well healed\par - Continue sinus wash BID\par - F/U 2 months to recheck

## 2021-03-19 NOTE — HISTORY OF PRESENT ILLNESS
[de-identified] : S/p TSPR with Dr. Hernandez for pituitary tumor 1/14/2020 with right septal flap\par No bleeding.  Pos nasal congestion on right.  No clear fluid d/c.  Sense of smell and taste are good.  Using sinus wash 2-3 times a day.

## 2021-03-19 NOTE — PROCEDURE
[FreeTextEntry6] : Flexible scope 24 and Rigid Scope # 8 was used. Right nasal passage with crusting that was debrided with forceps and suction. Well healed mucosa. Sphenoidotomy patent and well healed. Left nasal passage clear. Sphenoidotomy and septectomy are mostly clear other than a small crust. No evidence of CSF leak. Nasopharynx clear. \par \par

## 2021-05-11 ENCOUNTER — OUTPATIENT (OUTPATIENT)
Dept: OUTPATIENT SERVICES | Facility: HOSPITAL | Age: 61
LOS: 1 days | End: 2021-05-11
Payer: SELF-PAY

## 2021-05-11 ENCOUNTER — APPOINTMENT (OUTPATIENT)
Dept: MRI IMAGING | Facility: CLINIC | Age: 61
End: 2021-05-11
Payer: COMMERCIAL

## 2021-05-11 DIAGNOSIS — Z86.018 PERSONAL HISTORY OF OTHER BENIGN NEOPLASM: Chronic | ICD-10-CM

## 2021-05-11 DIAGNOSIS — Z98.890 OTHER SPECIFIED POSTPROCEDURAL STATES: Chronic | ICD-10-CM

## 2021-05-11 DIAGNOSIS — D35.2 BENIGN NEOPLASM OF PITUITARY GLAND: ICD-10-CM

## 2021-05-11 PROCEDURE — 70553 MRI BRAIN STEM W/O & W/DYE: CPT

## 2021-05-11 PROCEDURE — 70553 MRI BRAIN STEM W/O & W/DYE: CPT | Mod: 26

## 2021-05-11 PROCEDURE — A9585: CPT

## 2021-05-21 ENCOUNTER — APPOINTMENT (OUTPATIENT)
Dept: OTOLARYNGOLOGY | Facility: CLINIC | Age: 61
End: 2021-05-21
Payer: SELF-PAY

## 2021-05-21 VITALS
WEIGHT: 110 LBS | HEIGHT: 63 IN | SYSTOLIC BLOOD PRESSURE: 123 MMHG | HEART RATE: 81 BPM | DIASTOLIC BLOOD PRESSURE: 84 MMHG | TEMPERATURE: 98 F | BODY MASS INDEX: 19.49 KG/M2

## 2021-05-21 PROCEDURE — 31237 NSL/SINS NDSC SURG BX POLYPC: CPT | Mod: RT

## 2021-05-21 PROCEDURE — 99213 OFFICE O/P EST LOW 20 MIN: CPT | Mod: 25

## 2021-05-21 PROCEDURE — 99072 ADDL SUPL MATRL&STAF TM PHE: CPT

## 2021-05-21 NOTE — ASSESSMENT
[FreeTextEntry1] : S/p TSPR with Dr. Hernandez 1/14/2020 with R NS Septal flap:\par - Debrided in office today on right anterior septum.\par - well healed\par - Can use Nasal saline gel to nares.  \par - Continue sinus wash daily \par - F/U PRN\par \par \par I personally saw and examined Ms. KIP KING in detail this visit today. I personally reviewed the HPI, PMH, FH, SH, ROS and medications/allergies. I have spoken to DEEPAK Kline regarding the history and have personally determined the assessment and plan of care, and documented this myself. I was present and participated in all key portions of the encounter and all procedures noted above. I have made changes in the body of the note where appropriate.\par \par Attesting Faculty: See Attending Signature Below

## 2021-05-21 NOTE — PHYSICAL EXAM
[Normal] : normal appearance, well groomed, well nourished, and in no acute distress [de-identified] : minimal rusting on right

## 2021-05-21 NOTE — HISTORY OF PRESENT ILLNESS
[de-identified] : S/p TSPR with Dr. Hernandez for pituitary tumor 1/14/2021 with right septal flap\par Has some bleeding, Enough to run out of nose, but no dripping.  Pos nasal congestion on right.  No clear fluid d/c.  Sense of smell and taste are good.  Using sinus wash 2 times a day.

## 2021-05-21 NOTE — PROCEDURE
[FreeTextEntry6] : Flexible scope 32. Right nasal passage with crusting along septum that was debrided with forceps. Well healed mucosa. Sphenoidotomy patent and well healed. No cursting. Left nasal passage clear. Sphenoidotomy and septectomy are completely clear. No evidence of CSF leak. Nasopharynx clear. \par \par

## 2021-05-26 ENCOUNTER — APPOINTMENT (OUTPATIENT)
Dept: SPINE | Facility: CLINIC | Age: 61
End: 2021-05-26
Payer: COMMERCIAL

## 2021-05-26 VITALS
TEMPERATURE: 97.9 F | WEIGHT: 110 LBS | HEART RATE: 88 BPM | OXYGEN SATURATION: 96 % | DIASTOLIC BLOOD PRESSURE: 76 MMHG | HEIGHT: 63 IN | SYSTOLIC BLOOD PRESSURE: 110 MMHG | BODY MASS INDEX: 19.49 KG/M2

## 2021-05-26 PROCEDURE — 99072 ADDL SUPL MATRL&STAF TM PHE: CPT

## 2021-05-26 PROCEDURE — 99213 OFFICE O/P EST LOW 20 MIN: CPT

## 2021-06-10 ENCOUNTER — LABORATORY RESULT (OUTPATIENT)
Age: 61
End: 2021-06-10

## 2021-06-18 ENCOUNTER — APPOINTMENT (OUTPATIENT)
Dept: ENDOCRINOLOGY | Facility: CLINIC | Age: 61
End: 2021-06-18

## 2021-06-21 ENCOUNTER — APPOINTMENT (OUTPATIENT)
Dept: ENDOCRINOLOGY | Facility: CLINIC | Age: 61
End: 2021-06-21
Payer: COMMERCIAL

## 2021-06-21 VITALS
HEIGHT: 63 IN | HEART RATE: 61 BPM | WEIGHT: 110 LBS | TEMPERATURE: 97.7 F | DIASTOLIC BLOOD PRESSURE: 60 MMHG | BODY MASS INDEX: 19.49 KG/M2 | OXYGEN SATURATION: 98 % | RESPIRATION RATE: 16 BRPM | SYSTOLIC BLOOD PRESSURE: 110 MMHG

## 2021-06-21 LAB
ACTH SER-ACNC: 31.1 PG/ML
ALPHA SUBUNIT SERPL-MCNC: 0.85 NG/ML
IGF-1 INTERP: NORMAL
IGF-I BLD-MCNC: 142 NG/ML
T4 FREE SERPL DIALY-MCNC: 1.2 NG/DL

## 2021-06-21 PROCEDURE — 99072 ADDL SUPL MATRL&STAF TM PHE: CPT

## 2021-06-21 PROCEDURE — 99214 OFFICE O/P EST MOD 30 MIN: CPT | Mod: 25

## 2021-06-21 PROCEDURE — 36415 COLL VENOUS BLD VENIPUNCTURE: CPT

## 2021-06-21 NOTE — HISTORY OF PRESENT ILLNESS
[FreeTextEntry1] : 60 year female  f/u for pituitary evaluation \par \par *** Jun 21, 2021 ***\par \par feels ok overall, more frequent night time urination, more thirsty but is not craving ice cold water. slightly more tired\par \par MRI pit (5/11/21)- post-surgical changes . ? residual vs recurrent neoplasm along the left aspect of sella\par saw Dr. Hernandez- planned for another MRI in the fall\par \par *** Mar 17, 2021 ***\par \par feels well. no nasal bleeding, but still with some nasal discharge. seeing Dr. Kirkpatrick\par denies fatigue, headaches, dizziness, nausea/vomiting, increased thirst or craving for iced cold water. No polyuria. \par see labs attached\par FT4- 0.9 with FT4DD- 0.82\par \par \par *** Jan 29, 2021 ***\par \par s/p TSRP on 01/14/21\par Pathology- invasive pituitary adenoma. Immunostains (+) for synaptophysin, Cam 5.2, GH (weak), LH (weak), neg for ACTH, TSH,. Ki67 index- 2-4%.\par Immediately post surgery with frequent urination and a severe thirst, responded well to 1 injection of DDAVP\par post op: cortisol 21.5, Na- 140, Uosm- 154, FT4 - 0.8 with FT4DD- 0.87.\par Discharged on no medications. \par Feels well, denies fatigue, headaches, dizziness, nausea/vomiting, increased thirst or craving for iced cold water. No polyuria. Urinates 2-3 times at night, but has been doing this for years before. \par \par HPI:\par Mrs Pena has been diagnosed with an apparently non-secreting 2 cm pituitary adenoma in 2013, and underwent a TSSR by Dr. Newsome. She was briefly seen by an endocrinologist in [place], and remembers having only 1 follow up pituitary MRI since then. She was doing well over the past several years,  but recently developed persistent headaches, and a repeat MRI was done showing a recurrent pituitary tumor. \par MRI (105/20)-  3.8x2.8x2.8 sellar mass extending suprasellar and into the left cavernous sinus, encasing left internal carotid artery  and left auditory canal (with mass effect on the left 4th, 5th. 6th CN). + Mass effect on the optic chiasm. + mild effect on hypothalamus. \par She complains of some balance issues within past 6 months, but denies excessive fatigue, weight changes, peripheral vision changes ;  cold/heat intolerance ; new purple stretch marks or easy bruising  jaw protruding, change in facial features or change in shoe/hat/ring size ;  dizziness/nausea/vomiting/abdominal pain ; galactorrhea. Also, she denies diaphoresis or sweating, chest pain or palpitations, SOB, diarrhea or constipation, anxiety or tremor, muscle aches\par \par

## 2021-06-21 NOTE — ASSESSMENT
[FreeTextEntry1] : 1. Recurrent invasive pituitary adenoma with mass effect. S/p TSRP\par - FT4 was prev on downgoing trend. Will cont monitoring\par - might be developing a CDI\par - repeat BMP, plasma K+, serum/urine osm. If again abnormal, will add DDAVP\par - low K+ diet\par - daily weight, drink to thirst, monitor urinary output\par - repeat pit MRI in April and f/u with Dr. Hernandez after. F/u with a neuro-ophthalmologist (Dr. Valle ) as scheduled- next planned for 08/21\par - advised on post-operative endocrine complications and a possibility of a permanent panhypopituitarism, especially since she will likely require post-operative radiation therapy.\par \par 2. Hypercalcemia\par - repeat ca/PTH/ vitamin D\par RTC 3 mos, or sooner prn\par

## 2021-06-22 LAB
25(OH)D3 SERPL-MCNC: 66.7 NG/ML
ANION GAP SERPL CALC-SCNC: 9 MMOL/L
BUN SERPL-MCNC: 18 MG/DL
CA-I SERPL-SCNC: 1.33 MMOL/L
CALCIUM SERPL-MCNC: 10 MG/DL
CALCIUM SERPL-MCNC: 10 MG/DL
CHLORIDE SERPL-SCNC: 103 MMOL/L
CO2 SERPL-SCNC: 29 MMOL/L
CREAT SERPL-MCNC: 0.78 MG/DL
GLUCOSE SERPL-MCNC: 111 MG/DL
OSMOLALITY SERPL: 296 MOSMOL/KG
OSMOLALITY UR: 548 MOSM/KG
PARATHYROID HORMONE INTACT: 48 PG/ML
POTASSIUM SERPL-SCNC: 4.4 MMOL/L
POTASSIUM SERPL-SCNC: 4.7 MMOL/L
SODIUM SERPL-SCNC: 141 MMOL/L

## 2021-08-16 ENCOUNTER — TRANSCRIPTION ENCOUNTER (OUTPATIENT)
Age: 61
End: 2021-08-16

## 2021-09-30 ENCOUNTER — TRANSCRIPTION ENCOUNTER (OUTPATIENT)
Age: 61
End: 2021-09-30

## 2021-10-01 ENCOUNTER — TRANSCRIPTION ENCOUNTER (OUTPATIENT)
Age: 61
End: 2021-10-01

## 2021-10-08 LAB
ACTH SER-ACNC: 27.6 PG/ML
ALBUMIN SERPL ELPH-MCNC: 4.2 G/DL
ALP BLD-CCNC: 68 U/L
ALT SERPL-CCNC: 9 U/L
ANION GAP SERPL CALC-SCNC: 13 MMOL/L
AST SERPL-CCNC: 22 U/L
BILIRUB SERPL-MCNC: <0.2 MG/DL
BUN SERPL-MCNC: 18 MG/DL
CALCIUM SERPL-MCNC: 9 MG/DL
CALCIUM SERPL-MCNC: 9 MG/DL
CHLORIDE SERPL-SCNC: 94 MMOL/L
CO2 SERPL-SCNC: 22 MMOL/L
CREAT SERPL-MCNC: 0.84 MG/DL
GLUCOSE SERPL-MCNC: 70 MG/DL
IGF-1 INTERP: NORMAL
IGF-I BLD-MCNC: 144 NG/ML
OSMOLALITY SERPL: 290 MOSMOL/KG
PARATHYROID HORMONE INTACT: 31 PG/ML
POTASSIUM SERPL-SCNC: 5 MMOL/L
PROLACTIN SERPL-MCNC: 13.5 NG/ML
PROT SERPL-MCNC: 6.5 G/DL
SODIUM SERPL-SCNC: 130 MMOL/L
T4 FREE SERPL-MCNC: 0.9 NG/DL
TSH SERPL-ACNC: 1.07 UIU/ML

## 2021-10-09 LAB — CORTIS SERPL-MCNC: 9.1 UG/DL

## 2021-10-11 LAB
CA-I SERPL-SCNC: 1.26 MMOL/L
OSMOLALITY UR: 646 MOSM/KG

## 2021-10-21 ENCOUNTER — APPOINTMENT (OUTPATIENT)
Dept: ENDOCRINOLOGY | Facility: CLINIC | Age: 61
End: 2021-10-21
Payer: COMMERCIAL

## 2021-10-21 VITALS
HEART RATE: 74 BPM | RESPIRATION RATE: 16 BRPM | TEMPERATURE: 98 F | OXYGEN SATURATION: 98 % | DIASTOLIC BLOOD PRESSURE: 70 MMHG | BODY MASS INDEX: 19.67 KG/M2 | SYSTOLIC BLOOD PRESSURE: 120 MMHG | WEIGHT: 111 LBS | HEIGHT: 63 IN

## 2021-10-21 PROCEDURE — 99214 OFFICE O/P EST MOD 30 MIN: CPT | Mod: 25

## 2021-10-21 PROCEDURE — 36415 COLL VENOUS BLD VENIPUNCTURE: CPT

## 2021-10-21 NOTE — HISTORY OF PRESENT ILLNESS
[FreeTextEntry1] : 60 year female  f/u for pituitary evaluation \par \par *** Oct 21, 2021 ***\par \par feels great. no new c/o. has been drinking more fluids recently ("good to flush"), also was doing hot yoga- sweating a lot and drinking water only- cut down past 2 weeks after we discussed labs otp\par last TSH- 1.0, FT4- 0.9\par am cortisol 9.1, ACTH- 27, IGF-1-144, prl- 13.5\par saw Dr. Valle on 8/26/21- all is stable\par \par *** Jun 21, 2021 ***\par \par feels ok overall, more frequent night time urination, more thirsty but is not craving ice cold water. slightly more tired\par \par MRI pit (5/11/21)- post-surgical changes . ? residual vs recurrent neoplasm along the left aspect of sella\par saw Dr. Hernandez- planned for another MRI in the fall\par \par *** Mar 17, 2021 ***\par \par feels well. no nasal bleeding, but still with some nasal discharge. seeing Dr. Kirkpatrick\par denies fatigue, headaches, dizziness, nausea/vomiting, increased thirst or craving for iced cold water. No polyuria. \par see labs attached\par FT4- 0.9 with FT4DD- 0.82\par \par \par *** Jan 29, 2021 ***\par \par s/p TSRP on 01/14/21\par Pathology- invasive pituitary adenoma. Immunostains (+) for synaptophysin, Cam 5.2, GH (weak), LH (weak), neg for ACTH, TSH,. Ki67 index- 2-4%.\par Immediately post surgery with frequent urination and a severe thirst, responded well to 1 injection of DDAVP\par post op: cortisol 21.5, Na- 140, Uosm- 154, FT4 - 0.8 with FT4DD- 0.87.\par Discharged on no medications. \par Feels well, denies fatigue, headaches, dizziness, nausea/vomiting, increased thirst or craving for iced cold water. No polyuria. Urinates 2-3 times at night, but has been doing this for years before. \par \par HPI:\par Mrs Pena has been diagnosed with an apparently non-secreting 2 cm pituitary adenoma in 2013, and underwent a TSSR by Dr. Newsome. She was briefly seen by an endocrinologist in [place], and remembers having only 1 follow up pituitary MRI since then. She was doing well over the past several years,  but recently developed persistent headaches, and a repeat MRI was done showing a recurrent pituitary tumor. \par MRI (105/20)-  3.8x2.8x2.8 sellar mass extending suprasellar and into the left cavernous sinus, encasing left internal carotid artery  and left auditory canal (with mass effect on the left 4th, 5th. 6th CN). + Mass effect on the optic chiasm. + mild effect on hypothalamus. \par She complains of some balance issues within past 6 months, but denies excessive fatigue, weight changes, peripheral vision changes ;  cold/heat intolerance ; new purple stretch marks or easy bruising  jaw protruding, change in facial features or change in shoe/hat/ring size ;  dizziness/nausea/vomiting/abdominal pain ; galactorrhea. Also, she denies diaphoresis or sweating, chest pain or palpitations, SOB, diarrhea or constipation, anxiety or tremor, muscle aches\par \par

## 2021-10-21 NOTE — ASSESSMENT
[FreeTextEntry1] : 1. Recurrent invasive pituitary adenoma with mass effect. S/p TSRP\par - FT4 was prev on downgoing trend. Will cont monitoring\par - prev with a concern of developing CDI. most recent sodium is low- likely attributed to overhydration with water. advised on drinking to thirst, use Gatorade when doing hot yoga\par - repeat BMP, plasma K+, serum/urine osm.\par - low K+ diet\par - daily weight, drink to thirst, monitor urinary output\par - repeat pit MRI next month and f/u with Dr. Hernandez after. F/u with a neuro-ophthalmologist (Dr. Valle ) as scheduled- next planned for 2/22\par - advised on post-operative endocrine complications and a possibility of a permanent panhypopituitarism, especially since she will likely require post-operative radiation therapy.\par \par 2. H/o hypercalcemia\par - monitor ca/PTH/ vitamin D\par RTC 3 mos, or sooner prn\par

## 2021-10-22 ENCOUNTER — TRANSCRIPTION ENCOUNTER (OUTPATIENT)
Age: 61
End: 2021-10-22

## 2021-10-22 LAB
ANION GAP SERPL CALC-SCNC: 16 MMOL/L
BUN SERPL-MCNC: 15 MG/DL
CALCIUM SERPL-MCNC: 10.4 MG/DL
CHLORIDE SERPL-SCNC: 103 MMOL/L
CO2 SERPL-SCNC: 24 MMOL/L
CREAT SERPL-MCNC: 0.74 MG/DL
GLUCOSE SERPL-MCNC: 75 MG/DL
OSMOLALITY UR: 300 MOSM/KG
POTASSIUM SERPL-SCNC: 4.9 MMOL/L
POTASSIUM SERPL-SCNC: 5.3 MMOL/L
POTASSIUM UR-SCNC: 40.9 MMOL/L
SODIUM ?TM SUB UR QN: 61 MMOL/L
SODIUM SERPL-SCNC: 143 MMOL/L
T3 SERPL-MCNC: 82 NG/DL
T4 FREE SERPL-MCNC: 1 NG/DL

## 2021-10-24 LAB — OSMOLALITY SERPL: 296 MOSMOL/KG

## 2021-11-01 ENCOUNTER — APPOINTMENT (OUTPATIENT)
Dept: MRI IMAGING | Facility: IMAGING CENTER | Age: 61
End: 2021-11-01

## 2021-11-01 LAB — T4 FREE SERPL DIALY-MCNC: 0.92 NG/DL

## 2021-12-02 ENCOUNTER — OUTPATIENT (OUTPATIENT)
Dept: OUTPATIENT SERVICES | Facility: HOSPITAL | Age: 61
LOS: 1 days | End: 2021-12-02
Payer: COMMERCIAL

## 2021-12-02 ENCOUNTER — APPOINTMENT (OUTPATIENT)
Dept: MRI IMAGING | Facility: CLINIC | Age: 61
End: 2021-12-02
Payer: COMMERCIAL

## 2021-12-02 DIAGNOSIS — Z98.890 OTHER SPECIFIED POSTPROCEDURAL STATES: Chronic | ICD-10-CM

## 2021-12-02 DIAGNOSIS — D35.2 BENIGN NEOPLASM OF PITUITARY GLAND: ICD-10-CM

## 2021-12-02 DIAGNOSIS — Z86.018 PERSONAL HISTORY OF OTHER BENIGN NEOPLASM: Chronic | ICD-10-CM

## 2021-12-02 PROCEDURE — A9585: CPT

## 2021-12-02 PROCEDURE — 70553 MRI BRAIN STEM W/O & W/DYE: CPT | Mod: 26

## 2021-12-02 PROCEDURE — 70553 MRI BRAIN STEM W/O & W/DYE: CPT

## 2021-12-08 ENCOUNTER — APPOINTMENT (OUTPATIENT)
Dept: SPINE | Facility: CLINIC | Age: 61
End: 2021-12-08
Payer: COMMERCIAL

## 2021-12-08 VITALS
HEIGHT: 63 IN | DIASTOLIC BLOOD PRESSURE: 82 MMHG | SYSTOLIC BLOOD PRESSURE: 116 MMHG | WEIGHT: 111 LBS | HEART RATE: 73 BPM | OXYGEN SATURATION: 99 % | BODY MASS INDEX: 19.67 KG/M2

## 2021-12-08 PROCEDURE — 99213 OFFICE O/P EST LOW 20 MIN: CPT

## 2021-12-20 ENCOUNTER — TRANSCRIPTION ENCOUNTER (OUTPATIENT)
Age: 61
End: 2021-12-20

## 2022-01-09 LAB
ANION GAP SERPL CALC-SCNC: 13 MMOL/L
BUN SERPL-MCNC: 16 MG/DL
CALCIUM SERPL-MCNC: 10.2 MG/DL
CHLORIDE SERPL-SCNC: 104 MMOL/L
CO2 SERPL-SCNC: 28 MMOL/L
CORTIS SERPL-MCNC: 14.2 UG/DL
CREAT SERPL-MCNC: 0.85 MG/DL
GLUCOSE SERPL-MCNC: 75 MG/DL
OSMOLALITY SERPL: 301 MOSMOL/KG
OSMOLALITY UR: 404 MOSM/KG
POTASSIUM SERPL-SCNC: 5.6 MMOL/L
PROLACTIN SERPL-MCNC: 8.2 NG/ML
SODIUM SERPL-SCNC: 145 MMOL/L
T4 FREE SERPL-MCNC: 1 NG/DL
TSH SERPL-ACNC: 1.87 UIU/ML

## 2022-01-10 LAB
IGF-1 INTERP: NORMAL
IGF-I BLD-MCNC: 117 NG/ML

## 2022-01-19 ENCOUNTER — APPOINTMENT (OUTPATIENT)
Dept: ENDOCRINOLOGY | Facility: CLINIC | Age: 62
End: 2022-01-19
Payer: COMMERCIAL

## 2022-01-19 VITALS
TEMPERATURE: 97.8 F | RESPIRATION RATE: 16 BRPM | WEIGHT: 111 LBS | SYSTOLIC BLOOD PRESSURE: 120 MMHG | HEART RATE: 90 BPM | OXYGEN SATURATION: 98 % | DIASTOLIC BLOOD PRESSURE: 70 MMHG | BODY MASS INDEX: 19.67 KG/M2 | HEIGHT: 63 IN

## 2022-01-19 LAB — T4 FREE SERPL DIALY-MCNC: 1.2 NG/DL

## 2022-01-19 PROCEDURE — 99214 OFFICE O/P EST MOD 30 MIN: CPT

## 2022-01-19 NOTE — ASSESSMENT
[FreeTextEntry1] : 1. Recurrent invasive pituitary adenoma with mass effect. S/p TSRP\par - FT4 was prev on downgoing trend. Will cont monitoring\par - prev with a concern of developing CDI. prior low sodium was  likely attributed to overhydration with water. advised on drinking to thirst, use Gatorade when doing hot yoga\par - monitor BMP, plasma K+, serum/urine osm.\par - low K+ diet\par - daily weight, drink to thirst, monitor urinary output. we discussed trying a small dose of DDAVP at bedtime, but she wants to hold on for now\par - f/u with Dr. Hernandez in 12/22 and with a neuro-ophthalmologist (Dr. Valle ) as scheduled- next planned for 2/22\par - advised on post-operative endocrine complications and a possibility of a permanent panhypopituitarism, especially since she will likely require post-operative radiation therapy.\par \par 2. H/o hypercalcemia\par - monitor ca/PTH/ vitamin D\par RTC 4 mos, or sooner prn\par

## 2022-06-02 ENCOUNTER — LABORATORY RESULT (OUTPATIENT)
Age: 62
End: 2022-06-02

## 2022-06-03 ENCOUNTER — LABORATORY RESULT (OUTPATIENT)
Age: 62
End: 2022-06-03

## 2022-06-05 LAB
25(OH)D3 SERPL-MCNC: 52.4 NG/ML
ACTH SER-ACNC: 28.3 PG/ML
ALBUMIN SERPL ELPH-MCNC: 4.8 G/DL
ALP BLD-CCNC: 71 U/L
ALT SERPL-CCNC: 8 U/L
ANION GAP SERPL CALC-SCNC: 12 MMOL/L
AST SERPL-CCNC: 19 U/L
BILIRUB SERPL-MCNC: 0.2 MG/DL
BUN SERPL-MCNC: 18 MG/DL
CALCIUM SERPL-MCNC: 10.5 MG/DL
CALCIUM SERPL-MCNC: 10.5 MG/DL
CHLORIDE SERPL-SCNC: 102 MMOL/L
CO2 SERPL-SCNC: 28 MMOL/L
CORTIS SERPL-MCNC: 10.8 UG/DL
CREAT SERPL-MCNC: 0.85 MG/DL
EGFR: 77 ML/MIN/1.73M2
ESTIMATED AVERAGE GLUCOSE: 123 MG/DL
GH SERPL-MCNC: 1.41 NG/ML
GLUCOSE SERPL-MCNC: 88 MG/DL
HBA1C MFR BLD HPLC: 5.9 %
IGF-1 INTERP: NORMAL
IGF-I BLD-MCNC: 145 NG/ML
LH SERPL-ACNC: 41.3 IU/L
OSMOLALITY SERPL: 293 MOSMOL/KG
PARATHYROID HORMONE INTACT: 27 PG/ML
POTASSIUM SERPL-SCNC: 5 MMOL/L
POTASSIUM SERPL-SCNC: 5.2 MMOL/L
PROLACTIN SERPL-MCNC: 7.9 NG/ML
PROT SERPL-MCNC: 7.4 G/DL
SODIUM SERPL-SCNC: 142 MMOL/L
T3 SERPL-MCNC: 100 NG/DL
T4 FREE SERPL-MCNC: 1 NG/DL
TSH SERPL-ACNC: 1.1 UIU/ML

## 2022-06-08 ENCOUNTER — APPOINTMENT (OUTPATIENT)
Dept: ENDOCRINOLOGY | Facility: CLINIC | Age: 62
End: 2022-06-08
Payer: COMMERCIAL

## 2022-06-08 VITALS
OXYGEN SATURATION: 96 % | HEIGHT: 63 IN | HEART RATE: 70 BPM | SYSTOLIC BLOOD PRESSURE: 120 MMHG | WEIGHT: 108 LBS | DIASTOLIC BLOOD PRESSURE: 70 MMHG | BODY MASS INDEX: 19.14 KG/M2 | TEMPERATURE: 98.6 F

## 2022-06-08 DIAGNOSIS — E87.1 HYPO-OSMOLALITY AND HYPONATREMIA: ICD-10-CM

## 2022-06-08 PROCEDURE — 99214 OFFICE O/P EST MOD 30 MIN: CPT

## 2022-06-08 NOTE — HISTORY OF PRESENT ILLNESS
[FreeTextEntry1] : 62 year female  f/u for pituitary evaluation \par \par *** Jun 08, 2022 ***\par \par had covid 3 weeks ago, still with some residual fatigue. still with increased urination at night - nothing changed for many years, and has been way prior to the surgery\par planned for pit MRI in 09/22\par no recent neuro-ophthalmology appt \par \par *** Jan 19, 2022 ***\par \par feels great. more recently diet is high in K+ ( oranges, bananas). good energy level\par urinates at night every 2 hours, but not throughout the day. did not see the urologist yet. States that had the same problem even before the surgery.\par saw Dr. Hernandez in 12/21\par MRI (12/8/21)- stable post-op area of a decreased enhancement in central/left sella turcica 1.0x0.9. Stalk is deviated to the right. normal optic chiasm\par \par *** Oct 21, 2021 ***\par \par feels great. no new c/o. has been drinking more fluids recently ("good to flush"), also was doing hot yoga- sweating a lot and drinking water only- cut down past 2 weeks after we discussed labs otp\par last TSH- 1.0, FT4- 0.9\par am cortisol 9.1, ACTH- 27, IGF-1-144, prl- 13.5\par saw Dr. Valle on 8/26/21- all is stable\par \par *** Jun 21, 2021 ***\par \par feels ok overall, more frequent night time urination, more thirsty but is not craving ice cold water. slightly more tired\par \par MRI pit (5/11/21)- post-surgical changes . ? residual vs recurrent neoplasm along the left aspect of sella\par saw Dr. Hernandez- planned for another MRI in the fall\par \par *** Mar 17, 2021 ***\par \par feels well. no nasal bleeding, but still with some nasal discharge. seeing Dr. Kirkpatrick\par denies fatigue, headaches, dizziness, nausea/vomiting, increased thirst or craving for iced cold water. No polyuria. \par see labs attached\par FT4- 0.9 with FT4DD- 0.82\par \par \par *** Jan 29, 2021 ***\par \par s/p TSRP on 01/14/21\par Pathology- invasive pituitary adenoma. Immunostains (+) for synaptophysin, Cam 5.2, GH (weak), LH (weak), neg for ACTH, TSH,. Ki67 index- 2-4%.\par Immediately post surgery with frequent urination and a severe thirst, responded well to 1 injection of DDAVP\par post op: cortisol 21.5, Na- 140, Uosm- 154, FT4 - 0.8 with FT4DD- 0.87.\par Discharged on no medications. \par Feels well, denies fatigue, headaches, dizziness, nausea/vomiting, increased thirst or craving for iced cold water. No polyuria. Urinates 2-3 times at night, but has been doing this for years before. \par \par HPI:\par Mrs Pena has been diagnosed with an apparently non-secreting 2 cm pituitary adenoma in 2013, and underwent a TSSR by Dr. Newsome. She was briefly seen by an endocrinologist in [place], and remembers having only 1 follow up pituitary MRI since then. She was doing well over the past several years,  but recently developed persistent headaches, and a repeat MRI was done showing a recurrent pituitary tumor. \par MRI (105/20)-  3.8x2.8x2.8 sellar mass extending suprasellar and into the left cavernous sinus, encasing left internal carotid artery  and left auditory canal (with mass effect on the left 4th, 5th. 6th CN). + Mass effect on the optic chiasm. + mild effect on hypothalamus. \par She complains of some balance issues within past 6 months, but denies excessive fatigue, weight changes, peripheral vision changes ;  cold/heat intolerance ; new purple stretch marks or easy bruising  jaw protruding, change in facial features or change in shoe/hat/ring size ;  dizziness/nausea/vomiting/abdominal pain ; galactorrhea. Also, she denies diaphoresis or sweating, chest pain or palpitations, SOB, diarrhea or constipation, anxiety or tremor, muscle aches\par \par

## 2022-06-08 NOTE — ASSESSMENT
[FreeTextEntry1] : 1. Recurrent invasive pituitary adenoma with mass effect. S/p TSRP\par - FT4 was prev on downgoing trend. Will cont monitoring\par - prev with a concern of developing CDI. prior low sodium was  likely attributed to overhydration with water. advised on drinking to thirst, use Gatorade when doing hot yoga\par - monitor BMP, plasma K+, serum/urine osm.\par - low K+ diet\par - daily weight, drink to thirst, monitor urinary output. we discussed trying a small dose of DDAVP at bedtime, but she wants to hold on for now\par - pit MRI in 9/22\par - f/u with Dr. Hernandez in 12/22  and with a neuro-ophthalmologist (Dr. Valle ) as scheduled-\par - advised on post-operative endocrine complications and a possibility of a permanent panhypopituitarism, especially since she will likely require post-operative radiation therapy.\par \par 2. H/o hypercalcemia\par - monitor ca/PTH/ vitamin D\par RTC 6 mos, or sooner prn\par

## 2022-07-11 ENCOUNTER — APPOINTMENT (OUTPATIENT)
Dept: UROLOGY | Facility: CLINIC | Age: 62
End: 2022-07-11

## 2022-07-11 VITALS
WEIGHT: 108 LBS | BODY MASS INDEX: 19.14 KG/M2 | DIASTOLIC BLOOD PRESSURE: 79 MMHG | HEART RATE: 80 BPM | OXYGEN SATURATION: 97 % | SYSTOLIC BLOOD PRESSURE: 120 MMHG | TEMPERATURE: 97.9 F | HEIGHT: 63 IN

## 2022-07-11 DIAGNOSIS — Z63.4 DISAPPEARANCE AND DEATH OF FAMILY MEMBER: ICD-10-CM

## 2022-07-11 DIAGNOSIS — Z80.8 FAMILY HISTORY OF MALIGNANT NEOPLASM OF OTHER ORGANS OR SYSTEMS: ICD-10-CM

## 2022-07-11 DIAGNOSIS — Z84.1 FAMILY HISTORY OF DISORDERS OF KIDNEY AND URETER: ICD-10-CM

## 2022-07-11 DIAGNOSIS — Z87.891 PERSONAL HISTORY OF NICOTINE DEPENDENCE: ICD-10-CM

## 2022-07-11 DIAGNOSIS — R35.0 FREQUENCY OF MICTURITION: ICD-10-CM

## 2022-07-11 DIAGNOSIS — R35.1 NOCTURIA: ICD-10-CM

## 2022-07-11 DIAGNOSIS — U07.1 COVID-19: ICD-10-CM

## 2022-07-11 DIAGNOSIS — Z80.3 FAMILY HISTORY OF MALIGNANT NEOPLASM OF BREAST: ICD-10-CM

## 2022-07-11 DIAGNOSIS — Z80.0 FAMILY HISTORY OF MALIGNANT NEOPLASM OF DIGESTIVE ORGANS: ICD-10-CM

## 2022-07-11 DIAGNOSIS — R10.9 UNSPECIFIED ABDOMINAL PAIN: ICD-10-CM

## 2022-07-11 DIAGNOSIS — Z83.438 FAMILY HISTORY OF OTHER DISORDER OF LIPOPROTEIN METABOLISM AND OTHER LIPIDEMIA: ICD-10-CM

## 2022-07-11 PROCEDURE — 99204 OFFICE O/P NEW MOD 45 MIN: CPT | Mod: 25

## 2022-07-11 PROCEDURE — 51798 US URINE CAPACITY MEASURE: CPT

## 2022-07-11 SDOH — SOCIAL STABILITY - SOCIAL INSECURITY: DISSAPEARANCE AND DEATH OF FAMILY MEMBER: Z63.4

## 2022-07-11 NOTE — REVIEW OF SYSTEMS
[Dry Eyes] : dryness of the eyes [Painful Keats] : painful Keats [Loss of interest] : loss of interest in sexual activity [Sexually Transmitted Disease (STD)] : sexually transmitted disease [Date of last menstrual period ____] : date of last menstrual period: [unfilled] [History of kidney stones] : history of kidney stones [Wake up at night to urinate  How many times?  ___] : wakes up to urinate [unfilled] times during the night [Leakage of urine with straining, coughing, laughing] : leakage of urine with straining, coughing, laughing [Hot Flashes] : hot flashes [Negative] : Heme/Lymph [Constipation] : no constipation [FreeTextEntry1] : herpes

## 2022-07-11 NOTE — ASSESSMENT
[FreeTextEntry1] : Interested in botox, as this helped her sister with daytime and nighttime voiding.\par Knows about OAB meds, but we discussed side effects and she is not interested in them due to the side effects.  Explained that likely will never be approved for botox without first trying medications.\par \par Discussed a voiding diary, documenting the intake and voids, at night vs day etc..\par \par Cr 0.85/GFR 77 ml/min on 6/1/22\par \par Patient and I had an extremely lengthy conversation regarding her urgency and urge incontinence. We discussed a voiding diary, where she can demonstrate her time of waking and going to bed, as well as the number of voids in the daytime and evening. She should maintain this diary for 48-72 hours to establish her baseline. We talked at length about the Kegel exercises and bladder training. She should consciously make an effort to hold her urine for 5-10 minutes after she feels the need to void. Performing regular Kegel exercises, doing at least 3 sets of 10 per day, and holding the contraction for up to 10 seconds each, should help her to increase the interval between voids, and to thereby slowly increase her bladder capacity.\par \par She understands that she can do Kegels with or without the help of pelvic floor therapy where they can teach her to isolate and strengthen the musculature.\par \par Also, consider cutting fluid intake before bedtime, by 3 hrs or so.\par \par Will ck PVR at next visit with CARLOS for stone history and new Rt flank pain.\par \par 47 min spent\par \par \par \par

## 2022-07-11 NOTE — LETTER BODY
[Dear  ___] : Dear  [unfilled], [Consult Letter:] : I had the pleasure of evaluating your patient, [unfilled]. [Please see my note below.] : Please see my note below. [Consult Closing:] : Thank you very much for allowing me to participate in the care of this patient.  If you have any questions, please do not hesitate to contact me. [FreeTextEntry3] : Sincerely,\par \par Poonam Mason MD\par Clinical \par [facility]\par [facility]\par  [FreeTextEntry1] : Please see my note. I will keep you informed.

## 2022-07-11 NOTE — PHYSICAL EXAM
(0) Answers both questions correctly [General Appearance - Well Developed] : well developed [General Appearance - Well Nourished] : well nourished [Normal Appearance] : normal appearance [Well Groomed] : well groomed [General Appearance - In No Acute Distress] : no acute distress [Edema] : no peripheral edema [Respiration, Rhythm And Depth] : normal respiratory rhythm and effort [Exaggerated Use Of Accessory Muscles For Inspiration] : no accessory muscle use [Abdomen Soft] : soft [Abdomen Tenderness] : non-tender [Costovertebral Angle Tenderness] : no ~M costovertebral angle tenderness [Urinary Bladder Findings] : the bladder was normal on palpation [Normal Station and Gait] : the gait and station were normal for the patient's age [] : no rash [No Focal Deficits] : no focal deficits [Oriented To Time, Place, And Person] : oriented to person, place, and time [Affect] : the affect was normal [Mood] : the mood was normal [Not Anxious] : not anxious [No Palpable Adenopathy] : no palpable adenopathy

## 2022-07-11 NOTE — HISTORY OF PRESENT ILLNESS
[FreeTextEntry1] : KIP KING is a 62 year old F who presents with c/o frequency and nocturia.\par \par Voids 8 or 9x's/day\par Nocturia every 2 hrs\par Drinks warm water all day\par Occ UUI and occ EFRA\par May wear a mini liner daily, but does not soak them\par Drinks fluids at night as well.\par \par At end of stream, strains and gets more\par Denies constipation.\par \par Rare UTI's and no complicated UTI's: none w f/c/n/v, flank pain.\par h/o stones in past\par No GH\par \par painful intercourse when her  was living; currently not active

## 2022-07-12 LAB
APPEARANCE: CLEAR
BACTERIA: NEGATIVE
BILIRUBIN URINE: NEGATIVE
BLOOD URINE: NEGATIVE
COLOR: COLORLESS
GLUCOSE QUALITATIVE U: NEGATIVE
HYALINE CASTS: 0 /LPF
KETONES URINE: NEGATIVE
LEUKOCYTE ESTERASE URINE: NEGATIVE
MICROSCOPIC-UA: NORMAL
NITRITE URINE: NEGATIVE
PH URINE: 6.5
PROTEIN URINE: NEGATIVE
RED BLOOD CELLS URINE: 0 /HPF
SPECIFIC GRAVITY URINE: 1.01
SQUAMOUS EPITHELIAL CELLS: 0 /HPF
UROBILINOGEN URINE: NORMAL
WHITE BLOOD CELLS URINE: 0 /HPF

## 2022-07-13 LAB — BACTERIA UR CULT: NORMAL

## 2022-07-21 ENCOUNTER — APPOINTMENT (OUTPATIENT)
Dept: UROLOGY | Facility: CLINIC | Age: 62
End: 2022-07-21

## 2022-07-21 DIAGNOSIS — N20.0 CALCULUS OF KIDNEY: ICD-10-CM

## 2022-07-21 PROCEDURE — 76775 US EXAM ABDO BACK WALL LIM: CPT

## 2022-11-22 ENCOUNTER — OUTPATIENT (OUTPATIENT)
Dept: OUTPATIENT SERVICES | Facility: HOSPITAL | Age: 62
LOS: 1 days | End: 2022-11-22
Payer: COMMERCIAL

## 2022-11-22 ENCOUNTER — APPOINTMENT (OUTPATIENT)
Dept: MRI IMAGING | Facility: CLINIC | Age: 62
End: 2022-11-22

## 2022-11-22 DIAGNOSIS — Z86.018 PERSONAL HISTORY OF OTHER BENIGN NEOPLASM: Chronic | ICD-10-CM

## 2022-11-22 DIAGNOSIS — Z00.8 ENCOUNTER FOR OTHER GENERAL EXAMINATION: ICD-10-CM

## 2022-11-22 DIAGNOSIS — D35.2 BENIGN NEOPLASM OF PITUITARY GLAND: ICD-10-CM

## 2022-11-22 DIAGNOSIS — Z98.890 OTHER SPECIFIED POSTPROCEDURAL STATES: Chronic | ICD-10-CM

## 2022-11-22 PROCEDURE — A9585: CPT

## 2022-11-22 PROCEDURE — 70553 MRI BRAIN STEM W/O & W/DYE: CPT

## 2022-11-22 PROCEDURE — 70553 MRI BRAIN STEM W/O & W/DYE: CPT | Mod: 26

## 2022-12-07 ENCOUNTER — APPOINTMENT (OUTPATIENT)
Dept: SPINE | Facility: CLINIC | Age: 62
End: 2022-12-07

## 2022-12-07 ENCOUNTER — LABORATORY RESULT (OUTPATIENT)
Age: 62
End: 2022-12-07

## 2022-12-07 VITALS
DIASTOLIC BLOOD PRESSURE: 93 MMHG | WEIGHT: 112 LBS | HEIGHT: 63 IN | HEART RATE: 79 BPM | SYSTOLIC BLOOD PRESSURE: 141 MMHG | BODY MASS INDEX: 19.84 KG/M2 | OXYGEN SATURATION: 95 %

## 2022-12-07 PROCEDURE — 99213 OFFICE O/P EST LOW 20 MIN: CPT

## 2022-12-08 LAB
ACTH SER-ACNC: 23.8 PG/ML
ALBUMIN SERPL ELPH-MCNC: 4.5 G/DL
ALP BLD-CCNC: 71 U/L
ALT SERPL-CCNC: 9 U/L
ANION GAP SERPL CALC-SCNC: 10 MMOL/L
AST SERPL-CCNC: 27 U/L
BILIRUB SERPL-MCNC: 0.3 MG/DL
BUN SERPL-MCNC: 17 MG/DL
CALCIUM SERPL-MCNC: 10 MG/DL
CHLORIDE SERPL-SCNC: 105 MMOL/L
CO2 SERPL-SCNC: 28 MMOL/L
CORTIS SERPL-MCNC: 11.6 UG/DL
CREAT SERPL-MCNC: 0.86 MG/DL
EGFR: 76 ML/MIN/1.73M2
ESTRADIOL SERPL-MCNC: <5 PG/ML
FSH SERPL-MCNC: 100 IU/L
GLUCOSE SERPL-MCNC: 82 MG/DL
IGF-1 INTERP: NORMAL
IGF-I BLD-MCNC: 130 NG/ML
LH SERPL-ACNC: 45.3 IU/L
OSMOLALITY SERPL: 295 MOSMOL/KG
OSMOLALITY UR: 380 MOSM/KG
POTASSIUM SERPL-SCNC: 5.3 MMOL/L
POTASSIUM SERPL-SCNC: 5.3 MMOL/L
PROLACTIN SERPL-MCNC: 9 NG/ML
PROT SERPL-MCNC: 7.1 G/DL
SODIUM SERPL-SCNC: 143 MMOL/L
TSH SERPL-ACNC: 1.43 UIU/ML

## 2022-12-09 LAB
CALCIUM SERPL-MCNC: 10.4 MG/DL
PARATHYROID HORMONE INTACT: 24 PG/ML
T4 FREE SERPL-MCNC: 1 NG/DL

## 2022-12-14 ENCOUNTER — APPOINTMENT (OUTPATIENT)
Dept: ENDOCRINOLOGY | Facility: CLINIC | Age: 62
End: 2022-12-14

## 2022-12-14 VITALS
HEIGHT: 63 IN | TEMPERATURE: 97.3 F | BODY MASS INDEX: 20.02 KG/M2 | RESPIRATION RATE: 16 BRPM | HEART RATE: 64 BPM | SYSTOLIC BLOOD PRESSURE: 124 MMHG | WEIGHT: 113 LBS | DIASTOLIC BLOOD PRESSURE: 64 MMHG | OXYGEN SATURATION: 96 %

## 2022-12-14 PROCEDURE — 99214 OFFICE O/P EST MOD 30 MIN: CPT

## 2022-12-14 NOTE — HISTORY OF PRESENT ILLNESS
[FreeTextEntry1] : 62 year female  f/u for pituitary evaluation \par \par *** Dec 14, 2022 ***\par \par feels fine, denies major c/o. some fatigue, but not different from before.\par less urination at night. no cold/heat intolerance\par \par Pit MRI(11/22/22)-status post transsphenoidal pituitary surgery.  Residual pituitary gland is displaced towards the right with an associated infundibular deviation.  Stable triangular region of nonenhancing soft tissue along the left side of the sella turcica measuring 0.8 x 0.9 x 0.9 cm (favor postsurgical).  There is an abnormal hypoenhancing soft tissue within the left cavernous sinus measuring 1.7 x 0.7 x 1.5 cm, likely residual adenoma.  No suprasellar masses.  Optic chiasm is not compressed.  Hypothalamus is within normal limits\par \par labs as below\par \par *** Jun 08, 2022 ***\par \par had covid 3 weeks ago, still with some residual fatigue. still with increased urination at night - nothing changed for many years, and has been way prior to the surgery\par planned for pit MRI in 09/22\par no recent neuro-ophthalmology appt \par \par *** Jan 19, 2022 ***\par \par feels great. more recently diet is high in K+ ( oranges, bananas). good energy level\par urinates at night every 2 hours, but not throughout the day. did not see the urologist yet. States that had the same problem even before the surgery.\par saw Dr. Hernandez in 12/21\par MRI (12/8/21)- stable post-op area of a decreased enhancement in central/left sella turcica 1.0x0.9. Stalk is deviated to the right. normal optic chiasm\par \par *** Oct 21, 2021 ***\par \par feels great. no new c/o. has been drinking more fluids recently ("good to flush"), also was doing hot yoga- sweating a lot and drinking water only- cut down past 2 weeks after we discussed labs otp\par last TSH- 1.0, FT4- 0.9\par am cortisol 9.1, ACTH- 27, IGF-1-144, prl- 13.5\par saw Dr. Valle on 8/26/21- all is stable\par \par *** Jun 21, 2021 ***\par \par feels ok overall, more frequent night time urination, more thirsty but is not craving ice cold water. slightly more tired\par \par MRI pit (5/11/21)- post-surgical changes . ? residual vs recurrent neoplasm along the left aspect of sella\par saw Dr. Hernandez- planned for another MRI in the fall\par \par *** Mar 17, 2021 ***\par \par feels well. no nasal bleeding, but still with some nasal discharge. seeing Dr. Kirkpatrick\par denies fatigue, headaches, dizziness, nausea/vomiting, increased thirst or craving for iced cold water. No polyuria. \par see labs attached\par FT4- 0.9 with FT4DD- 0.82\par \par \par *** Jan 29, 2021 ***\par \par s/p TSRP on 01/14/21\par Pathology- invasive pituitary adenoma. Immunostains (+) for synaptophysin, Cam 5.2, GH (weak), LH (weak), neg for ACTH, TSH,. Ki67 index- 2-4%.\par Immediately post surgery with frequent urination and a severe thirst, responded well to 1 injection of DDAVP\par post op: cortisol 21.5, Na- 140, Uosm- 154, FT4 - 0.8 with FT4DD- 0.87.\par Discharged on no medications. \par Feels well, denies fatigue, headaches, dizziness, nausea/vomiting, increased thirst or craving for iced cold water. No polyuria. Urinates 2-3 times at night, but has been doing this for years before. \par \par HPI:\par Mrs Pena has been diagnosed with an apparently non-secreting 2 cm pituitary adenoma in 2013, and underwent a TSSR by Dr. Newsome. She was briefly seen by an endocrinologist in [place], and remembers having only 1 follow up pituitary MRI since then. She was doing well over the past several years,  but recently developed persistent headaches, and a repeat MRI was done showing a recurrent pituitary tumor. \par MRI (105/20)-  3.8x2.8x2.8 sellar mass extending suprasellar and into the left cavernous sinus, encasing left internal carotid artery  and left auditory canal (with mass effect on the left 4th, 5th. 6th CN). + Mass effect on the optic chiasm. + mild effect on hypothalamus. \par She complains of some balance issues within past 6 months, but denies excessive fatigue, weight changes, peripheral vision changes ;  cold/heat intolerance ; new purple stretch marks or easy bruising  jaw protruding, change in facial features or change in shoe/hat/ring size ;  dizziness/nausea/vomiting/abdominal pain ; galactorrhea. Also, she denies diaphoresis or sweating, chest pain or palpitations, SOB, diarrhea or constipation, anxiety or tremor, muscle aches\par \par

## 2022-12-14 NOTE — ASSESSMENT
[FreeTextEntry1] : 1. Recurrent invasive pituitary adenoma with mass effect. S/p TSRP\par - FT4 was prev on downgoing trend. Will cont monitoring\par - prev with a concern of developing CDI. prior low sodium was  likely attributed to overhydration with water. advised on drinking to thirst, use Gatorade when doing hot yoga\par - monitor BMP, plasma K+, serum/urine osm.\par - low K+ diet\par - daily weight, drink to thirst, monitor urinary output. we discussed trying a small dose of DDAVP at bedtime, but she wants to hold on for now\par - the tissue in the left cavernous sinus could be slightly increased vs technical differences of the MRI\par - she's planned to repeat a pit MRI in 5/23, and   f/u with Dr. Hernandez in 6/23  and with a neuro-ophthalmologist (Dr. Valle ) in 02/23\par - if continuous growth- possibly treatment with Gamma Knife stereotactic radiosurgery.\par - advised on post-operative endocrine complications and a possibility of a permanent panhypopituitarism, especially since she will likely require post-operative radiation therapy.\par \par 2. H/o hypercalcemia\par - monitor ca/PTH/ vitamin D\par - stable at present\par RTC 6 mos, or sooner prn\par

## 2022-12-19 LAB — T4 FREE SERPL DIALY-MCNC: 0.99 NG/DL

## 2023-02-02 ENCOUNTER — OFFICE (OUTPATIENT)
Dept: URBAN - METROPOLITAN AREA CLINIC 93 | Facility: CLINIC | Age: 63
Setting detail: OPHTHALMOLOGY
End: 2023-02-02
Payer: COMMERCIAL

## 2023-02-02 DIAGNOSIS — H53.433: ICD-10-CM

## 2023-02-02 DIAGNOSIS — D35.2: ICD-10-CM

## 2023-02-02 DIAGNOSIS — H52.03: ICD-10-CM

## 2023-02-02 DIAGNOSIS — H25.13: ICD-10-CM

## 2023-02-02 DIAGNOSIS — H35.033: ICD-10-CM

## 2023-02-02 DIAGNOSIS — H52.4: ICD-10-CM

## 2023-02-02 PROCEDURE — 92014 COMPRE OPH EXAM EST PT 1/>: CPT | Performed by: OPHTHALMOLOGY

## 2023-02-02 PROCEDURE — 92015 DETERMINE REFRACTIVE STATE: CPT | Performed by: OPHTHALMOLOGY

## 2023-02-02 PROCEDURE — 92133 CPTRZD OPH DX IMG PST SGM ON: CPT | Performed by: OPHTHALMOLOGY

## 2023-02-02 PROCEDURE — 92083 EXTENDED VISUAL FIELD XM: CPT | Performed by: OPHTHALMOLOGY

## 2023-02-02 ASSESSMENT — REFRACTION_CURRENTRX
OS_OVR_VA: 20/
OD_VPRISM_DIRECTION: PROGS
OD_SPHERE: +1.50
OS_CYLINDER: -0.75
OD_AXIS: 050
OD_AXIS: 55
OD_VPRISM_DIRECTION: PROGS
OS_VPRISM_DIRECTION: PROGS
OS_OVR_VA: 20/
OD_AXIS: 47
OS_ADD: +2.25
OS_ADD: +2.50
OD_CYLINDER: -0.50
OD_OVR_VA: 20/
OD_ADD: +2.25
OS_CYLINDER: -1.25
OS_AXIS: 113
OS_VPRISM_DIRECTION: PROGS
OD_OVR_VA: 20/
OS_AXIS: 107
OD_SPHERE: +2.25
OS_AXIS: 113
OS_SPHERE: +1.75
OD_SPHERE: +1.50
OD_ADD: +2.25
OD_VPRISM_DIRECTION: PROGS
OS_VPRISM_DIRECTION: PROGS
OS_SPHERE: +1.50
OS_ADD: +2.25
OD_CYLINDER: -0.75
OS_SPHERE: +3.00
OD_OVR_VA: 20/
OD_CYLINDER: -0.50
OS_OVR_VA: 20/
OS_CYLINDER: -0.50
OD_AXIS: 041
OD_ADD: +2.25
OD_ADD: +2.50
OD_CYLINDER: -0.75

## 2023-02-02 ASSESSMENT — REFRACTION_MANIFEST
OD_ADD: +2.25
OD_ADD: +2.50
OD_CYLINDER: -0.75
OS_CYLINDER: -1.00
OD_CYLINDER: -0.50
OD_AXIS: 040
OS_AXIS: 120
OS_VA1: 20/25+2
OD_VA1: 20/25
OS_SPHERE: +2.00
OS_CYLINDER: -1.25
OD_VA1: 20/25
OD_SPHERE: +1.50
OS_SPHERE: +3.00
OD_SPHERE: +2.25
OS_ADD: +2.50
OS_ADD: +2.25
OS_VA1: 20/20
OS_AXIS: 105
OD_AXIS: 050

## 2023-02-02 ASSESSMENT — KERATOMETRY
OS_K2POWER_DIOPTERS: 44.75
OD_K1POWER_DIOPTERS: 44.25
OS_AXISANGLE_DEGREES: 046
OD_AXISANGLE_DEGREES: 120
OS_K1POWER_DIOPTERS: 43.75
OD_K2POWER_DIOPTERS: 45.00

## 2023-02-02 ASSESSMENT — AXIALLENGTH_DERIVED
OS_AL: 22.3614
OD_AL: 22.77
OS_AL: 22.4493
OS_AL: 22.7623
OD_AL: 22.5011
OD_AL: 22.4568

## 2023-02-02 ASSESSMENT — SPHEQUIV_DERIVED
OS_SPHEQUIV: 2.375
OD_SPHEQUIV: 1.875
OD_SPHEQUIV: 1.125
OS_SPHEQUIV: 1.5
OD_SPHEQUIV: 2
OS_SPHEQUIV: 2.625

## 2023-02-02 ASSESSMENT — REFRACTION_AUTOREFRACTION
OD_AXIS: 052
OS_AXIS: 111
OD_SPHERE: +2.25
OS_CYLINDER: -1.25
OD_CYLINDER: -0.75
OS_SPHERE: +3.25

## 2023-02-02 ASSESSMENT — VISUAL ACUITY: OD_BCVA: 20/25-2

## 2023-02-02 ASSESSMENT — CONFRONTATIONAL VISUAL FIELD TEST (CVF)
OS_FINDINGS: FULL
OD_FINDINGS: FULL

## 2023-04-27 ENCOUNTER — APPOINTMENT (OUTPATIENT)
Dept: OTOLARYNGOLOGY | Facility: CLINIC | Age: 63
End: 2023-04-27
Payer: COMMERCIAL

## 2023-04-27 VITALS
HEIGHT: 63 IN | SYSTOLIC BLOOD PRESSURE: 109 MMHG | DIASTOLIC BLOOD PRESSURE: 76 MMHG | HEART RATE: 82 BPM | WEIGHT: 110 LBS | BODY MASS INDEX: 19.49 KG/M2

## 2023-04-27 DIAGNOSIS — H61.23 IMPACTED CERUMEN, BILATERAL: ICD-10-CM

## 2023-04-27 DIAGNOSIS — J34.89 OTHER SPECIFIED DISORDERS OF NOSE AND NASAL SINUSES: ICD-10-CM

## 2023-04-27 DIAGNOSIS — H93.8X9 OTHER SPECIFIED DISORDERS OF EAR, UNSPECIFIED EAR: ICD-10-CM

## 2023-04-27 PROCEDURE — 99213 OFFICE O/P EST LOW 20 MIN: CPT

## 2023-04-27 NOTE — CONSULT LETTER
[Dear  ___] : Dear  [unfilled], [Consult Letter:] : I had the pleasure of evaluating your patient, [unfilled]. [Please see my note below.] : Please see my note below. [Consult Closing:] : Thank you very much for allowing me to participate in the care of this patient.  If you have any questions, please do not hesitate to contact me. [Sincerely,] : Sincerely, [FreeTextEntry2] : Gail Hernandez MD ([place], [place])

## 2023-04-27 NOTE — ASSESSMENT
[FreeTextEntry1] : S/p TSPR with Dr. Hernandez 1/14/2020 with R NS Septal flap:\par - some crusting along right septum\par - Can use Nasal saline gel to right septum for crusting\par - Continue sinus wash daily \par \par cerumen:\par - cleaned today\par - F/U PRN\par

## 2023-04-27 NOTE — HISTORY OF PRESENT ILLNESS
[de-identified] : 62 year old woman, last seen 2021, follow up for clogged ears\par History of bilateral cerumen impaction - nasal crusting - pituitary adenoma - s/p TSPR with Dr. Hernandez 1/14/2020 (with Right NS septal flap)\par Reports mild symptoms of vertigo while walking- relieved on its own, only lasting a few seconds - no medication taken\par Denies otalgia, otorrhea, tinnitus, hearing changes, headaches related to ears, recent fevers or ear infections\par No sinus issues or symptoms

## 2023-04-27 NOTE — PHYSICAL EXAM
[Normal] : inferior turbinates and middle turbinates are normal [de-identified] : cerumen impaction [de-identified] : right septum with crusting

## 2023-05-22 ENCOUNTER — OUTPATIENT (OUTPATIENT)
Dept: OUTPATIENT SERVICES | Facility: HOSPITAL | Age: 63
LOS: 1 days | End: 2023-05-22
Payer: COMMERCIAL

## 2023-05-22 ENCOUNTER — APPOINTMENT (OUTPATIENT)
Dept: MRI IMAGING | Facility: CLINIC | Age: 63
End: 2023-05-22
Payer: COMMERCIAL

## 2023-05-22 DIAGNOSIS — D35.2 BENIGN NEOPLASM OF PITUITARY GLAND: ICD-10-CM

## 2023-05-22 DIAGNOSIS — Z98.890 OTHER SPECIFIED POSTPROCEDURAL STATES: Chronic | ICD-10-CM

## 2023-05-22 DIAGNOSIS — Z86.018 PERSONAL HISTORY OF OTHER BENIGN NEOPLASM: Chronic | ICD-10-CM

## 2023-05-22 PROCEDURE — 70553 MRI BRAIN STEM W/O & W/DYE: CPT | Mod: 26

## 2023-05-22 PROCEDURE — 70553 MRI BRAIN STEM W/O & W/DYE: CPT

## 2023-05-22 PROCEDURE — A9585: CPT

## 2023-06-07 ENCOUNTER — APPOINTMENT (OUTPATIENT)
Dept: SPINE | Facility: CLINIC | Age: 63
End: 2023-06-07
Payer: COMMERCIAL

## 2023-06-07 VITALS
BODY MASS INDEX: 20.24 KG/M2 | WEIGHT: 110 LBS | DIASTOLIC BLOOD PRESSURE: 63 MMHG | OXYGEN SATURATION: 95 % | HEART RATE: 85 BPM | SYSTOLIC BLOOD PRESSURE: 119 MMHG | HEIGHT: 62 IN

## 2023-06-07 PROCEDURE — 99213 OFFICE O/P EST LOW 20 MIN: CPT

## 2023-06-15 ENCOUNTER — APPOINTMENT (OUTPATIENT)
Dept: ENDOCRINOLOGY | Facility: CLINIC | Age: 63
End: 2023-06-15
Payer: COMMERCIAL

## 2023-06-15 VITALS
HEART RATE: 66 BPM | WEIGHT: 111 LBS | SYSTOLIC BLOOD PRESSURE: 118 MMHG | RESPIRATION RATE: 16 BRPM | HEIGHT: 62 IN | BODY MASS INDEX: 20.43 KG/M2 | TEMPERATURE: 98 F | DIASTOLIC BLOOD PRESSURE: 64 MMHG | OXYGEN SATURATION: 97 %

## 2023-06-15 DIAGNOSIS — E87.0 HYPEROSMOLALITY AND HYPERNATREMIA: ICD-10-CM

## 2023-06-15 PROCEDURE — 99214 OFFICE O/P EST MOD 30 MIN: CPT

## 2023-06-15 NOTE — ASSESSMENT
[FreeTextEntry1] : 1. Recurrent invasive pituitary adenoma with mass effect. S/p TSRP\par - FT4 was prev on downgoing trend. Will cont monitoring\par - prev with a concern of developing CDI. prior low sodium was  likely attributed to overhydration with water. advised on drinking to thirst, use Gatorade when doing hot yoga\par - monitor BMP, plasma K+, serum/urine osm.\par - low K+ diet\par - daily weight, drink to thirst, monitor urinary output. we discussed trying a small dose of DDAVP at bedtime- will rx 0.05 mg at HS and monitor for symptoms\par - the tissue in the left cavernous sinus could be slightly increased vs technical differences of the MRI\par - she's planned to repeat a pit MRI in 11/23, and   f/u with Dr. Hernandez in 12/23  and with a neuro-ophthalmologist (Dr. Valle ) in 08/23\par - if continuous growth- possibly treatment with Gamma Knife stereotactic radiosurgery.\par - advised on post-operative endocrine complications and a possibility of a permanent panhypopituitarism, especially since she will likely require post-operative radiation therapy.\par \par 2. H/o hypercalcemia\par - monitor ca/PTH/ vitamin D\par - stable at present\par RTC 6 mos, or sooner prn\par

## 2023-06-15 NOTE — HISTORY OF PRESENT ILLNESS
[FreeTextEntry1] : 63 year female  f/u for pituitary evaluation \par \par *** Senthil 15, 2023 ***\par \par feels well. ok energy \par saw Dr. Hernandez last week- conservative management\par diet is till high in potassium (bananas, lots of avocado, spinach). still urinates q 2 hrs at night\par \par Labs from 6/13/2023–sodium 145, potassium–5.4, TSH–1.4, free T41.0 And free T4 by direct dialysis 1.2.  ACTH 35.8 with a.m. cortisol of 12.9, prolactin–7.5, IGF-I–146.  Serum/urine osmolality normal.  Ionized calcium 5.2, serum calcium 10.3 with intact PTH 28\par \par Pituitary MRI (5/22/2023)–postoperative changes compatible with transsphenoidal surgery.  Stable residual pituitary adenoma measuring 1.7 x 0.8 cm, involving the cavernous sinus region.  Pituitary stalk is deviated to the right side and appears unchanged.  Optic chiasm is normal.\par \par Visual field test (2-23)–no bitemporal field cut\par \par \par *** Dec 14, 2022 ***\par \par feels fine, denies major c/o. some fatigue, but not different from before.\par less urination at night. no cold/heat intolerance\par \par Pit MRI(11/22/22)-status post transsphenoidal pituitary surgery.  Residual pituitary gland is displaced towards the right with an associated infundibular deviation.  Stable triangular region of nonenhancing soft tissue along the left side of the sella turcica measuring 0.8 x 0.9 x 0.9 cm (favor postsurgical).  There is an abnormal hypoenhancing soft tissue within the left cavernous sinus measuring 1.7 x 0.7 x 1.5 cm, likely residual adenoma.  No suprasellar masses.  Optic chiasm is not compressed.  Hypothalamus is within normal limits\par \par labs as below\par \par *** Jun 08, 2022 ***\par \par had covid 3 weeks ago, still with some residual fatigue. still with increased urination at night - nothing changed for many years, and has been way prior to the surgery\par planned for pit MRI in 09/22\par no recent neuro-ophthalmology appt \par \par *** Jan 19, 2022 ***\par \par feels great. more recently diet is high in K+ ( oranges, bananas). good energy level\par urinates at night every 2 hours, but not throughout the day. did not see the urologist yet. States that had the same problem even before the surgery.\par saw Dr. Hernandez in 12/21\par MRI (12/8/21)- stable post-op area of a decreased enhancement in central/left sella turcica 1.0x0.9. Stalk is deviated to the right. normal optic chiasm\par \par *** Oct 21, 2021 ***\par \par feels great. no new c/o. has been drinking more fluids recently ("good to flush"), also was doing hot yoga- sweating a lot and drinking water only- cut down past 2 weeks after we discussed labs otp\par last TSH- 1.0, FT4- 0.9\par am cortisol 9.1, ACTH- 27, IGF-1-144, prl- 13.5\par saw Dr. Valle on 8/26/21- all is stable\par \par *** Jun 21, 2021 ***\par \par feels ok overall, more frequent night time urination, more thirsty but is not craving ice cold water. slightly more tired\par \par MRI pit (5/11/21)- post-surgical changes . ? residual vs recurrent neoplasm along the left aspect of sella\par saw Dr. Hernandez- planned for another MRI in the fall\par \par *** Mar 17, 2021 ***\par \par feels well. no nasal bleeding, but still with some nasal discharge. seeing Dr. Kirkpatrick\par denies fatigue, headaches, dizziness, nausea/vomiting, increased thirst or craving for iced cold water. No polyuria. \par see labs attached\par FT4- 0.9 with FT4DD- 0.82\par \par \par *** Jan 29, 2021 ***\par \par s/p TSRP on 01/14/21\par Pathology- invasive pituitary adenoma. Immunostains (+) for synaptophysin, Cam 5.2, GH (weak), LH (weak), neg for ACTH, TSH,. Ki67 index- 2-4%.\par Immediately post surgery with frequent urination and a severe thirst, responded well to 1 injection of DDAVP\par post op: cortisol 21.5, Na- 140, Uosm- 154, FT4 - 0.8 with FT4DD- 0.87.\par Discharged on no medications. \par Feels well, denies fatigue, headaches, dizziness, nausea/vomiting, increased thirst or craving for iced cold water. No polyuria. Urinates 2-3 times at night, but has been doing this for years before. \par \par HPI:\par Mrs Pena has been diagnosed with an apparently non-secreting 2 cm pituitary adenoma in 2013, and underwent a TSSR by Dr. Newsome. She was briefly seen by an endocrinologist in [place], and remembers having only 1 follow up pituitary MRI since then. She was doing well over the past several years,  but recently developed persistent headaches, and a repeat MRI was done showing a recurrent pituitary tumor. \par MRI (105/20)-  3.8x2.8x2.8 sellar mass extending suprasellar and into the left cavernous sinus, encasing left internal carotid artery  and left auditory canal (with mass effect on the left 4th, 5th. 6th CN). + Mass effect on the optic chiasm. + mild effect on hypothalamus. \par She complains of some balance issues within past 6 months, but denies excessive fatigue, weight changes, peripheral vision changes ;  cold/heat intolerance ; new purple stretch marks or easy bruising  jaw protruding, change in facial features or change in shoe/hat/ring size ;  dizziness/nausea/vomiting/abdominal pain ; galactorrhea. Also, she denies diaphoresis or sweating, chest pain or palpitations, SOB, diarrhea or constipation, anxiety or tremor, muscle aches\par \par

## 2023-08-03 ENCOUNTER — OFFICE (OUTPATIENT)
Facility: LOCATION | Age: 63
Setting detail: OPHTHALMOLOGY
End: 2023-08-03
Payer: COMMERCIAL

## 2023-08-03 DIAGNOSIS — H25.13: ICD-10-CM

## 2023-08-03 DIAGNOSIS — H52.03: ICD-10-CM

## 2023-08-03 DIAGNOSIS — D35.2: ICD-10-CM

## 2023-08-03 DIAGNOSIS — H53.433: ICD-10-CM

## 2023-08-03 PROCEDURE — 92083 EXTENDED VISUAL FIELD XM: CPT | Performed by: OPHTHALMOLOGY

## 2023-08-03 PROCEDURE — 92012 INTRM OPH EXAM EST PATIENT: CPT | Performed by: OPHTHALMOLOGY

## 2023-08-03 PROCEDURE — 92133 CPTRZD OPH DX IMG PST SGM ON: CPT | Performed by: OPHTHALMOLOGY

## 2023-08-03 ASSESSMENT — REFRACTION_MANIFEST
OS_VA1: 20/25
OD_VA1: 20/25
OS_CYLINDER: -0.75
OD_AXIS: 055
OS_SPHERE: +1.50
OD_CYLINDER: -0.50
OS_AXIS: 060
OS_ADD: +2.50
OD_ADD: +2.50
OD_SPHERE: +1.50

## 2023-08-03 ASSESSMENT — VISUAL ACUITY
OD_BCVA: 20/30-1
OS_BCVA: 20/25

## 2023-08-03 ASSESSMENT — REFRACTION_CURRENTRX
OD_CYLINDER: -0.75
OS_VPRISM_DIRECTION: PROGS
OS_AXIS: 107
OS_SPHERE: +1.75
OS_ADD: +2.50
OS_VPRISM_DIRECTION: PROGS
OS_SPHERE: +3.00
OD_VPRISM_DIRECTION: PROGS
OD_CYLINDER: -0.75
OD_AXIS: 050
OS_ADD: +2.25
OS_OVR_VA: 20/
OD_AXIS: 041
OD_SPHERE: +1.50
OD_CYLINDER: -0.50
OD_OVR_VA: 20/
OS_CYLINDER: -0.75
OD_SPHERE: +1.50
OS_ADD: +2.25
OD_OVR_VA: 20/
OD_ADD: +2.25
OD_SPHERE: +2.25
OS_SPHERE: +1.50
OD_OVR_VA: 20/
OD_ADD: +2.50
OS_OVR_VA: 20/
OD_ADD: +2.25
OD_VPRISM_DIRECTION: PROGS
OD_AXIS: 55
OS_OVR_VA: 20/
OS_CYLINDER: -1.25
OD_CYLINDER: -0.50
OD_AXIS: 47
OS_VPRISM_DIRECTION: PROGS
OD_VPRISM_DIRECTION: PROGS
OS_AXIS: 113
OS_AXIS: 113
OD_ADD: +2.25
OS_CYLINDER: -0.50

## 2023-08-03 ASSESSMENT — REFRACTION_AUTOREFRACTION
OS_AXIS: 108
OD_CYLINDER: -1.00
OS_SPHERE: +3.25
OD_SPHERE: +2.50
OD_AXIS: 068
OS_CYLINDER: -1.25

## 2023-08-03 ASSESSMENT — KERATOMETRY
OS_K1POWER_DIOPTERS: 44.50
OS_K2POWER_DIOPTERS: 43.75
OD_K2POWER_DIOPTERS: 44.75
OD_K1POWER_DIOPTERS: 44.50
OD_AXISANGLE_DEGREES: 112
OS_AXISANGLE_DEGREES: 122

## 2023-08-03 ASSESSMENT — AXIALLENGTH_DERIVED
OS_AL: 22.4027
OD_AL: 22.7248
OS_AL: 22.9424
OD_AL: 22.4568

## 2023-08-03 ASSESSMENT — SPHEQUIV_DERIVED
OS_SPHEQUIV: 1.125
OD_SPHEQUIV: 1.25
OS_SPHEQUIV: 2.625
OD_SPHEQUIV: 2

## 2023-08-03 ASSESSMENT — CONFRONTATIONAL VISUAL FIELD TEST (CVF)
OS_FINDINGS: FULL
OD_FINDINGS: FULL

## 2023-11-21 ENCOUNTER — APPOINTMENT (OUTPATIENT)
Dept: MRI IMAGING | Facility: CLINIC | Age: 63
End: 2023-11-21

## 2023-11-29 ENCOUNTER — APPOINTMENT (OUTPATIENT)
Dept: MRI IMAGING | Facility: CLINIC | Age: 63
End: 2023-11-29
Payer: COMMERCIAL

## 2023-11-29 PROCEDURE — 70553 MRI BRAIN STEM W/O & W/DYE: CPT

## 2023-11-29 PROCEDURE — A9585: CPT

## 2023-12-06 ENCOUNTER — APPOINTMENT (OUTPATIENT)
Dept: SPINE | Facility: CLINIC | Age: 63
End: 2023-12-06
Payer: COMMERCIAL

## 2023-12-06 VITALS
WEIGHT: 110 LBS | HEIGHT: 62 IN | SYSTOLIC BLOOD PRESSURE: 130 MMHG | DIASTOLIC BLOOD PRESSURE: 84 MMHG | BODY MASS INDEX: 20.24 KG/M2 | HEART RATE: 77 BPM | OXYGEN SATURATION: 98 %

## 2023-12-06 PROCEDURE — 99213 OFFICE O/P EST LOW 20 MIN: CPT

## 2023-12-06 RX ORDER — DESMOPRESSIN ACETATE 0.1 MG/1
0.1 TABLET ORAL
Qty: 45 | Refills: 1 | Status: DISCONTINUED | COMMUNITY
Start: 2023-06-15 | End: 2023-12-06

## 2023-12-12 ENCOUNTER — APPOINTMENT (OUTPATIENT)
Dept: ENDOCRINOLOGY | Facility: CLINIC | Age: 63
End: 2023-12-12
Payer: COMMERCIAL

## 2023-12-12 VITALS
OXYGEN SATURATION: 99 % | TEMPERATURE: 98 F | HEART RATE: 62 BPM | SYSTOLIC BLOOD PRESSURE: 120 MMHG | DIASTOLIC BLOOD PRESSURE: 60 MMHG | RESPIRATION RATE: 16 BRPM | BODY MASS INDEX: 19.69 KG/M2 | WEIGHT: 107 LBS | HEIGHT: 62 IN

## 2023-12-12 DIAGNOSIS — E83.52 HYPERCALCEMIA: ICD-10-CM

## 2023-12-12 DIAGNOSIS — E87.5 HYPERKALEMIA: ICD-10-CM

## 2023-12-12 DIAGNOSIS — D35.2 BENIGN NEOPLASM OF PITUITARY GLAND: ICD-10-CM

## 2023-12-12 DIAGNOSIS — E78.5 HYPERLIPIDEMIA, UNSPECIFIED: ICD-10-CM

## 2023-12-12 PROCEDURE — 99214 OFFICE O/P EST MOD 30 MIN: CPT

## 2024-02-08 ENCOUNTER — OFFICE (OUTPATIENT)
Facility: LOCATION | Age: 64
Setting detail: OPHTHALMOLOGY
End: 2024-02-08
Payer: COMMERCIAL

## 2024-02-08 DIAGNOSIS — H43.811: ICD-10-CM

## 2024-02-08 DIAGNOSIS — H25.13: ICD-10-CM

## 2024-02-08 DIAGNOSIS — H53.433: ICD-10-CM

## 2024-02-08 DIAGNOSIS — H35.033: ICD-10-CM

## 2024-02-08 DIAGNOSIS — D35.2: ICD-10-CM

## 2024-02-08 PROCEDURE — 92201 OPSCPY EXTND RTA DRAW UNI/BI: CPT | Performed by: OPHTHALMOLOGY

## 2024-02-08 PROCEDURE — 92133 CPTRZD OPH DX IMG PST SGM ON: CPT | Performed by: OPHTHALMOLOGY

## 2024-02-08 PROCEDURE — 92014 COMPRE OPH EXAM EST PT 1/>: CPT | Performed by: OPHTHALMOLOGY

## 2024-02-08 PROCEDURE — 92083 EXTENDED VISUAL FIELD XM: CPT | Performed by: OPHTHALMOLOGY

## 2024-02-08 ASSESSMENT — REFRACTION_CURRENTRX
OS_SPHERE: +3.00
OS_AXIS: 107
OD_VPRISM_DIRECTION: PROGS
OS_OVR_VA: 20/
OD_OVR_VA: 20/
OS_CYLINDER: -1.25
OS_VPRISM_DIRECTION: PROGS
OD_CYLINDER: -0.75
OS_SPHERE: +1.75
OD_OVR_VA: 20/
OD_ADD: +2.25
OS_VPRISM_DIRECTION: PROGS
OD_CYLINDER: -0.50
OD_OVR_VA: 20/
OD_CYLINDER: -0.75
OS_ADD: +2.50
OS_AXIS: 113
OS_CYLINDER: -0.75
OD_SPHERE: +1.50
OS_VPRISM_DIRECTION: PROGS
OD_AXIS: 041
OS_OVR_VA: 20/
OS_AXIS: 113
OD_AXIS: 47
OS_ADD: +2.25
OS_CYLINDER: -0.50
OD_AXIS: 050
OD_ADD: +2.50
OS_ADD: +2.25
OD_VPRISM_DIRECTION: PROGS
OD_AXIS: 55
OD_CYLINDER: -0.50
OD_SPHERE: +1.50
OD_VPRISM_DIRECTION: PROGS
OS_SPHERE: +1.50
OS_OVR_VA: 20/
OD_SPHERE: +2.25
OD_ADD: +2.25
OD_ADD: +2.25

## 2024-02-08 ASSESSMENT — REFRACTION_AUTOREFRACTION
OS_CYLINDER: -1.25
OS_SPHERE: +3.00
OD_AXIS: 052
OD_SPHERE: +2.00
OS_AXIS: 119
OD_CYLINDER: -0.75

## 2024-02-08 ASSESSMENT — REFRACTION_MANIFEST
OD_ADD: +2.50
OS_VA1: 20/25
OD_CYLINDER: -0.50
OD_AXIS: 055
OS_SPHERE: +1.50
OD_SPHERE: +1.50
OS_ADD: +2.50
OD_VA1: 20/25
OS_CYLINDER: -0.75
OS_AXIS: 060

## 2024-02-08 ASSESSMENT — CONFRONTATIONAL VISUAL FIELD TEST (CVF)
OD_FINDINGS: FULL
OS_FINDINGS: FULL

## 2024-02-08 ASSESSMENT — SPHEQUIV_DERIVED
OD_SPHEQUIV: 1.625
OS_SPHEQUIV: 2.375
OD_SPHEQUIV: 1.25
OS_SPHEQUIV: 1.125

## 2024-05-09 ENCOUNTER — OFFICE (OUTPATIENT)
Facility: LOCATION | Age: 64
Setting detail: OPHTHALMOLOGY
End: 2024-05-09
Payer: COMMERCIAL

## 2024-05-09 DIAGNOSIS — H53.433: ICD-10-CM

## 2024-05-09 DIAGNOSIS — H47.333: ICD-10-CM

## 2024-05-09 PROCEDURE — 92083 EXTENDED VISUAL FIELD XM: CPT | Performed by: OPHTHALMOLOGY

## 2024-05-09 PROCEDURE — 92250 FUNDUS PHOTOGRAPHY W/I&R: CPT | Performed by: OPHTHALMOLOGY

## 2024-05-09 PROCEDURE — 99213 OFFICE O/P EST LOW 20 MIN: CPT | Performed by: OPHTHALMOLOGY

## 2024-05-09 ASSESSMENT — CONFRONTATIONAL VISUAL FIELD TEST (CVF)
OS_FINDINGS: FULL
OD_FINDINGS: FULL

## 2024-06-18 ENCOUNTER — APPOINTMENT (OUTPATIENT)
Dept: ENDOCRINOLOGY | Facility: CLINIC | Age: 64
End: 2024-06-18

## 2024-11-14 ENCOUNTER — OFFICE (OUTPATIENT)
Facility: LOCATION | Age: 64
Setting detail: OPHTHALMOLOGY
End: 2024-11-14
Payer: MEDICARE

## 2024-11-14 DIAGNOSIS — D35.2: ICD-10-CM

## 2024-11-14 DIAGNOSIS — H25.13: ICD-10-CM

## 2024-11-14 DIAGNOSIS — H53.433: ICD-10-CM

## 2024-11-14 DIAGNOSIS — H52.03: ICD-10-CM

## 2024-11-14 PROCEDURE — 92015 DETERMINE REFRACTIVE STATE: CPT | Performed by: OPHTHALMOLOGY

## 2024-11-14 PROCEDURE — 92083 EXTENDED VISUAL FIELD XM: CPT | Performed by: OPHTHALMOLOGY

## 2024-11-14 PROCEDURE — 92012 INTRM OPH EXAM EST PATIENT: CPT | Performed by: OPHTHALMOLOGY

## 2024-11-14 PROCEDURE — 92133 CPTRZD OPH DX IMG PST SGM ON: CPT | Performed by: OPHTHALMOLOGY

## 2024-11-14 ASSESSMENT — KERATOMETRY
OS_K2POWER_DIOPTERS: 43.25
OS_K1POWER_DIOPTERS: 44.00
OD_AXISANGLE_DEGREES: 095
OS_AXISANGLE_DEGREES: 135
OD_K1POWER_DIOPTERS: 44.25
METHOD_AUTO_MANUAL: AUTO
OD_K2POWER_DIOPTERS: 43.75

## 2024-11-14 ASSESSMENT — REFRACTION_CURRENTRX
OS_OVR_VA: 20/
OD_OVR_VA: 20/
OD_SPHERE: +1.50
OD_CYLINDER: -0.50
OS_ADD: +2.50
OD_SPHERE: +1.50
OD_AXIS: 55
OD_OVR_VA: 20/
OD_AXIS: 055
OS_SPHERE: +1.50
OS_AXIS: 120
OS_VPRISM_DIRECTION: PROGS
OD_VPRISM_DIRECTION: PROGS
OS_AXIS: 113
OD_ADD: +2.25
OS_VPRISM_DIRECTION: PROGS
OD_CYLINDER: -0.75
OS_CYLINDER: -0.75
OD_CYLINDER: -0.75
OS_SPHERE: +1.50
OS_OVR_VA: 20/
OS_SPHERE: +1.75
OS_ADD: +2.25
OS_CYLINDER: -0.75
OS_ADD: +2.50
OS_CYLINDER: -0.75
OS_OVR_VA: 20/
OD_OVR_VA: 20/
OD_SPHERE: +1.50
OS_AXIS: 107
OD_ADD: +2.50
OD_VPRISM_DIRECTION: PROGS
OD_ADD: +2.25
OD_AXIS: 050
OD_ADD: +2.50
OD_AXIS: 055
OD_CYLINDER: -0.75

## 2024-11-14 ASSESSMENT — REFRACTION_AUTOREFRACTION
OS_SPHERE: +3.50
OS_CYLINDER: -1.25
OD_CYLINDER: -1.25
OD_AXIS: 105
OD_SPHERE: +2.75
OS_AXIS: 105

## 2024-11-14 ASSESSMENT — REFRACTION_MANIFEST
OS_VA1: 20/25
OD_SPHERE: +1.75
OD_ADD: +2.50
OS_ADD: +2.50
OS_ADD: +2.50
OD_VA1: 20/25
OS_VA1: 20/25
OD_AXIS: 050
OD_ADD: +2.50
OS_SPHERE: +2.25
OS_CYLINDER: -0.75
OS_AXIS: 060
OD_CYLINDER: -1.00
OS_AXIS: 105
OD_AXIS: 055
OS_SPHERE: +1.50
OD_CYLINDER: -0.50
OS_CYLINDER: -1.00
OD_SPHERE: +1.50
OD_VA1: 20/20-1

## 2024-11-14 ASSESSMENT — CONFRONTATIONAL VISUAL FIELD TEST (CVF)
OS_FINDINGS: FULL
OD_FINDINGS: FULL

## 2024-11-14 ASSESSMENT — VISUAL ACUITY
OD_BCVA: 20/25+1
OS_BCVA: 20/25+1

## 2024-12-04 ENCOUNTER — OUTPATIENT (OUTPATIENT)
Dept: OUTPATIENT SERVICES | Facility: HOSPITAL | Age: 64
LOS: 1 days | End: 2024-12-04
Payer: MEDICARE

## 2024-12-04 ENCOUNTER — APPOINTMENT (OUTPATIENT)
Dept: MRI IMAGING | Facility: CLINIC | Age: 64
End: 2024-12-04

## 2024-12-04 DIAGNOSIS — Z98.890 OTHER SPECIFIED POSTPROCEDURAL STATES: Chronic | ICD-10-CM

## 2024-12-04 DIAGNOSIS — Z86.018 PERSONAL HISTORY OF OTHER BENIGN NEOPLASM: Chronic | ICD-10-CM

## 2024-12-04 DIAGNOSIS — D35.2 BENIGN NEOPLASM OF PITUITARY GLAND: ICD-10-CM

## 2024-12-04 PROCEDURE — 70553 MRI BRAIN STEM W/O & W/DYE: CPT

## 2024-12-04 PROCEDURE — 70553 MRI BRAIN STEM W/O & W/DYE: CPT | Mod: 26,MH

## 2024-12-04 PROCEDURE — A9585: CPT

## 2024-12-30 ENCOUNTER — APPOINTMENT (OUTPATIENT)
Dept: ENDOCRINOLOGY | Facility: CLINIC | Age: 64
End: 2024-12-30
Payer: MEDICARE

## 2024-12-30 VITALS
BODY MASS INDEX: 19.51 KG/M2 | WEIGHT: 106 LBS | SYSTOLIC BLOOD PRESSURE: 129 MMHG | OXYGEN SATURATION: 99 % | HEIGHT: 62 IN | DIASTOLIC BLOOD PRESSURE: 81 MMHG | RESPIRATION RATE: 16 BRPM | HEART RATE: 68 BPM | TEMPERATURE: 98.1 F

## 2024-12-30 DIAGNOSIS — E87.5 HYPERKALEMIA: ICD-10-CM

## 2024-12-30 DIAGNOSIS — E78.5 HYPERLIPIDEMIA, UNSPECIFIED: ICD-10-CM

## 2024-12-30 DIAGNOSIS — E83.52 HYPERCALCEMIA: ICD-10-CM

## 2024-12-30 DIAGNOSIS — E87.0 HYPEROSMOLALITY AND HYPERNATREMIA: ICD-10-CM

## 2024-12-30 PROCEDURE — G2211 COMPLEX E/M VISIT ADD ON: CPT

## 2024-12-30 PROCEDURE — 99204 OFFICE O/P NEW MOD 45 MIN: CPT

## 2025-01-08 ENCOUNTER — NON-APPOINTMENT (OUTPATIENT)
Age: 65
End: 2025-01-08

## 2025-01-08 ENCOUNTER — APPOINTMENT (OUTPATIENT)
Dept: SPINE | Facility: CLINIC | Age: 65
End: 2025-01-08
Payer: MEDICARE

## 2025-01-08 VITALS
HEIGHT: 62 IN | RESPIRATION RATE: 17 BRPM | SYSTOLIC BLOOD PRESSURE: 142 MMHG | OXYGEN SATURATION: 97 % | DIASTOLIC BLOOD PRESSURE: 85 MMHG | HEART RATE: 84 BPM | WEIGHT: 106 LBS | BODY MASS INDEX: 19.51 KG/M2

## 2025-01-08 DIAGNOSIS — D35.2 BENIGN NEOPLASM OF PITUITARY GLAND: ICD-10-CM

## 2025-01-08 PROCEDURE — 99203 OFFICE O/P NEW LOW 30 MIN: CPT

## 2025-01-15 DIAGNOSIS — G93.89 OTHER SPECIFIED DISORDERS OF BRAIN: ICD-10-CM

## 2025-01-27 ENCOUNTER — APPOINTMENT (OUTPATIENT)
Dept: RADIATION ONCOLOGY | Facility: CLINIC | Age: 65
End: 2025-01-27
Payer: MEDICARE

## 2025-01-27 VITALS
RESPIRATION RATE: 17 BRPM | OXYGEN SATURATION: 98 % | DIASTOLIC BLOOD PRESSURE: 86 MMHG | SYSTOLIC BLOOD PRESSURE: 139 MMHG | HEART RATE: 68 BPM | HEIGHT: 62 IN | WEIGHT: 108.25 LBS | TEMPERATURE: 97.7 F | BODY MASS INDEX: 19.92 KG/M2

## 2025-01-27 PROCEDURE — 99205 OFFICE O/P NEW HI 60 MIN: CPT

## 2025-01-29 ENCOUNTER — OUTPATIENT (OUTPATIENT)
Dept: OUTPATIENT SERVICES | Facility: HOSPITAL | Age: 65
LOS: 1 days | Discharge: HOME | End: 2025-01-29
Payer: MEDICARE

## 2025-01-29 DIAGNOSIS — D35.2 BENIGN NEOPLASM OF PITUITARY GLAND: ICD-10-CM

## 2025-01-29 DIAGNOSIS — Z86.018 PERSONAL HISTORY OF OTHER BENIGN NEOPLASM: Chronic | ICD-10-CM

## 2025-01-29 DIAGNOSIS — Z98.890 OTHER SPECIFIED POSTPROCEDURAL STATES: Chronic | ICD-10-CM

## 2025-02-10 PROCEDURE — 77334 RADIATION TREATMENT AID(S): CPT | Mod: 26

## 2025-02-10 PROCEDURE — 77263 THER RADIOLOGY TX PLNG CPLX: CPT

## 2025-02-10 PROCEDURE — 77290 THER RAD SIMULAJ FIELD CPLX: CPT | Mod: 26

## 2025-02-18 ENCOUNTER — APPOINTMENT (OUTPATIENT)
Dept: SPINE | Facility: AMBULATORY SURGERY CENTER | Age: 65
End: 2025-02-18
Payer: MEDICARE

## 2025-02-18 PROCEDURE — 61798 SRS CRANIAL LESION COMPLEX: CPT

## 2025-02-18 PROCEDURE — 77432 STEREOTACTIC RADIATION TRMT: CPT

## 2025-02-18 PROCEDURE — 77300 RADIATION THERAPY DOSE PLAN: CPT | Mod: 26

## 2025-02-18 PROCEDURE — 77295 3-D RADIOTHERAPY PLAN: CPT | Mod: 26

## 2025-02-18 PROCEDURE — 77334 RADIATION TREATMENT AID(S): CPT | Mod: 26

## 2025-02-19 PROCEDURE — 77432 STEREOTACTIC RADIATION TRMT: CPT

## 2025-03-04 ENCOUNTER — APPOINTMENT (OUTPATIENT)
Dept: SPINE | Facility: CLINIC | Age: 65
End: 2025-03-04
Payer: MEDICARE

## 2025-03-04 VITALS
HEIGHT: 62 IN | DIASTOLIC BLOOD PRESSURE: 77 MMHG | BODY MASS INDEX: 19.88 KG/M2 | HEART RATE: 74 BPM | RESPIRATION RATE: 17 BRPM | OXYGEN SATURATION: 100 % | SYSTOLIC BLOOD PRESSURE: 137 MMHG | WEIGHT: 108 LBS

## 2025-03-04 DIAGNOSIS — D35.2 BENIGN NEOPLASM OF PITUITARY GLAND: ICD-10-CM

## 2025-03-04 PROCEDURE — 99024 POSTOP FOLLOW-UP VISIT: CPT

## 2025-03-19 ENCOUNTER — APPOINTMENT (OUTPATIENT)
Dept: RADIATION ONCOLOGY | Facility: CLINIC | Age: 65
End: 2025-03-19

## 2025-03-19 VITALS
BODY MASS INDEX: 19.98 KG/M2 | DIASTOLIC BLOOD PRESSURE: 80 MMHG | HEART RATE: 68 BPM | RESPIRATION RATE: 16 BRPM | HEIGHT: 62 IN | SYSTOLIC BLOOD PRESSURE: 140 MMHG | TEMPERATURE: 205.7 F | WEIGHT: 108.58 LBS | OXYGEN SATURATION: 100 %

## 2025-03-19 PROCEDURE — 99215 OFFICE O/P EST HI 40 MIN: CPT

## 2025-03-27 ENCOUNTER — APPOINTMENT (OUTPATIENT)
Dept: OTOLARYNGOLOGY | Facility: CLINIC | Age: 65
End: 2025-03-27
Payer: MEDICARE

## 2025-03-27 VITALS
WEIGHT: 108 LBS | TEMPERATURE: 208.76 F | HEIGHT: 62 IN | BODY MASS INDEX: 19.88 KG/M2 | DIASTOLIC BLOOD PRESSURE: 82 MMHG | SYSTOLIC BLOOD PRESSURE: 118 MMHG | OXYGEN SATURATION: 98 % | HEART RATE: 91 BPM

## 2025-03-27 DIAGNOSIS — D35.2 BENIGN NEOPLASM OF PITUITARY GLAND: ICD-10-CM

## 2025-03-27 DIAGNOSIS — H93.8X3 OTHER SPECIFIED DISORDERS OF EAR, BILATERAL: ICD-10-CM

## 2025-03-27 DIAGNOSIS — R09.81 NASAL CONGESTION: ICD-10-CM

## 2025-03-27 DIAGNOSIS — H61.23 IMPACTED CERUMEN, BILATERAL: ICD-10-CM

## 2025-03-27 DIAGNOSIS — H91.93 UNSPECIFIED HEARING LOSS, BILATERAL: ICD-10-CM

## 2025-03-27 PROCEDURE — 92557 COMPREHENSIVE HEARING TEST: CPT

## 2025-03-27 PROCEDURE — 69210 REMOVE IMPACTED EAR WAX UNI: CPT

## 2025-03-27 PROCEDURE — 31231 NASAL ENDOSCOPY DX: CPT

## 2025-03-27 PROCEDURE — 99203 OFFICE O/P NEW LOW 30 MIN: CPT | Mod: 25

## 2025-03-27 PROCEDURE — 92567 TYMPANOMETRY: CPT

## 2025-05-15 ENCOUNTER — OFFICE (OUTPATIENT)
Facility: LOCATION | Age: 65
Setting detail: OPHTHALMOLOGY
End: 2025-05-15
Payer: MEDICARE

## 2025-05-15 ENCOUNTER — RX ONLY (RX ONLY)
Age: 65
End: 2025-05-15

## 2025-05-15 DIAGNOSIS — H25.13: ICD-10-CM

## 2025-05-15 DIAGNOSIS — D35.2: ICD-10-CM

## 2025-05-15 DIAGNOSIS — H52.03: ICD-10-CM

## 2025-05-15 DIAGNOSIS — H43.811: ICD-10-CM

## 2025-05-15 DIAGNOSIS — H53.433: ICD-10-CM

## 2025-05-15 DIAGNOSIS — H47.333: ICD-10-CM

## 2025-05-15 DIAGNOSIS — H35.033: ICD-10-CM

## 2025-05-15 DIAGNOSIS — H52.4: ICD-10-CM

## 2025-05-15 PROCEDURE — 92083 EXTENDED VISUAL FIELD XM: CPT | Performed by: OPHTHALMOLOGY

## 2025-05-15 PROCEDURE — 99214 OFFICE O/P EST MOD 30 MIN: CPT | Performed by: OPHTHALMOLOGY

## 2025-05-15 PROCEDURE — 92015 DETERMINE REFRACTIVE STATE: CPT | Performed by: OPHTHALMOLOGY

## 2025-05-15 PROCEDURE — 92133 CPTRZD OPH DX IMG PST SGM ON: CPT | Performed by: OPHTHALMOLOGY

## 2025-05-15 ASSESSMENT — REFRACTION_CURRENTRX
OS_AXIS: 104
OS_CYLINDER: -1.50
OD_ADD: +2.25
OD_OVR_VA: 20/
OS_OVR_VA: 20/
OD_AXIS: 050
OS_CYLINDER: -0.75
OD_OVR_VA: 20/
OS_OVR_VA: 20/
OS_ADD: +2.50
OD_CYLINDER: -0.50
OS_ADD: +2.00
OD_OVR_VA: 20/
OS_AXIS: 114
OD_VPRISM_DIRECTION: PROGS
OS_SPHERE: +3.25
OS_SPHERE: +1.75
OD_AXIS: 050
OD_VPRISM_DIRECTION: PROGS
OS_VPRISM_DIRECTION: PROGS
OD_SPHERE: +1.50
OD_AXIS: 059
OD_CYLINDER: -0.75
OS_VPRISM_DIRECTION: PROGS
OS_ADD: +2.25
OD_ADD: +2.25
OD_CYLINDER: -0.50
OD_AXIS: 051
OS_AXIS: 115
OS_OVR_VA: 20/
OD_SPHERE: +2.50
OS_CYLINDER: -0.75
OD_SPHERE: +1.50
OD_ADD: +2.00
OD_ADD: +2.25
OD_CYLINDER: -0.75
OS_SPHERE: +1.75

## 2025-05-15 ASSESSMENT — REFRACTION_MANIFEST
OS_ADD: +2.50
OD_CYLINDER: -0.50
OD_ADD: +2.50
OS_AXIS: 105
OS_ADD: +2.00
OD_AXIS: 055
OD_VA1: 20/20-1
OD_AXIS: 050
OD_SPHERE: +1.50
OD_CYLINDER: -0.50
OD_AXIS: 050
OD_SPHERE: +1.75
OS_CYLINDER: -1.50
OS_SPHERE: +2.25
OS_VA1: 20/25
OS_ADD: +2.50
OS_AXIS: 060
OD_ADD: +2.50
OS_AXIS: 105
OS_VA1: 20/25
OD_VA1: 20/25
OD_SPHERE: +2.50
OD_CYLINDER: -1.00
OS_CYLINDER: -1.00
OS_SPHERE: +1.50
OS_SPHERE: +3.25
OS_CYLINDER: -0.75
OD_ADD: +2.25

## 2025-05-15 ASSESSMENT — VISUAL ACUITY
OD_BCVA: 20/25
OS_BCVA: 20/30-2

## 2025-05-15 ASSESSMENT — REFRACTION_AUTOREFRACTION
OD_CYLINDER: -1.25
OS_CYLINDER: -1.75
OD_SPHERE: +3.50
OD_AXIS: 059
OS_SPHERE: +4.00
OS_AXIS: 113

## 2025-05-15 ASSESSMENT — CONFRONTATIONAL VISUAL FIELD TEST (CVF)
OD_FINDINGS: FULL
OS_FINDINGS: FULL

## 2025-05-15 ASSESSMENT — KERATOMETRY
OS_K1POWER_DIOPTERS: 43.75
OD_K2POWER_DIOPTERS: 44.75
OS_AXISANGLE_DEGREES: 035
OD_AXISANGLE_DEGREES: 148
OD_K1POWER_DIOPTERS: 44.25
METHOD_AUTO_MANUAL: AUTO
OS_K2POWER_DIOPTERS: 44.75

## 2025-05-20 NOTE — PRE-OP CHECKLIST - HEIGHT IN FEET
----- Message from ALEXANDRIA Leyva sent at 5/20/2025  2:27 PM EDT -----  Hi Katerine, Your labs your white and red blood cells are all normal. Your kidney functions, electrolytes, blood sugar and liver functions are all normal. Your thyroid is normal, cholesterol levels are   slightly higher than last year but still okay. Will recheck labs in 1 year  ----- Message -----  From: Lab, Background User  Sent: 5/20/2025   1:14 PM EDT  To: ALEXANDRIA Ruiz     5

## 2025-06-16 ENCOUNTER — APPOINTMENT (OUTPATIENT)
Dept: ENDOCRINOLOGY | Facility: CLINIC | Age: 65
End: 2025-06-16

## 2025-07-28 ENCOUNTER — APPOINTMENT (OUTPATIENT)
Dept: ENDOCRINOLOGY | Facility: CLINIC | Age: 65
End: 2025-07-28
Payer: MEDICARE

## 2025-07-28 VITALS
WEIGHT: 110 LBS | TEMPERATURE: 209.66 F | OXYGEN SATURATION: 96 % | HEART RATE: 70 BPM | RESPIRATION RATE: 16 BRPM | SYSTOLIC BLOOD PRESSURE: 121 MMHG | DIASTOLIC BLOOD PRESSURE: 75 MMHG | HEIGHT: 62 IN | BODY MASS INDEX: 20.24 KG/M2

## 2025-07-28 DIAGNOSIS — E83.52 HYPERCALCEMIA: ICD-10-CM

## 2025-07-28 DIAGNOSIS — E87.0 HYPEROSMOLALITY AND HYPERNATREMIA: ICD-10-CM

## 2025-07-28 DIAGNOSIS — D35.2 BENIGN NEOPLASM OF PITUITARY GLAND: ICD-10-CM

## 2025-07-28 DIAGNOSIS — E87.5 HYPERKALEMIA: ICD-10-CM

## 2025-07-28 PROCEDURE — G2211 COMPLEX E/M VISIT ADD ON: CPT

## 2025-07-28 PROCEDURE — 99214 OFFICE O/P EST MOD 30 MIN: CPT

## 2025-08-05 ENCOUNTER — APPOINTMENT (OUTPATIENT)
Dept: MRI IMAGING | Facility: CLINIC | Age: 65
End: 2025-08-05

## 2025-08-05 ENCOUNTER — OUTPATIENT (OUTPATIENT)
Dept: OUTPATIENT SERVICES | Facility: HOSPITAL | Age: 65
LOS: 1 days | End: 2025-08-05
Payer: MEDICARE

## 2025-08-05 DIAGNOSIS — Z98.890 OTHER SPECIFIED POSTPROCEDURAL STATES: Chronic | ICD-10-CM

## 2025-08-05 DIAGNOSIS — Z86.018 PERSONAL HISTORY OF OTHER BENIGN NEOPLASM: Chronic | ICD-10-CM

## 2025-08-05 DIAGNOSIS — D35.2 BENIGN NEOPLASM OF PITUITARY GLAND: ICD-10-CM

## 2025-08-05 PROCEDURE — 70553 MRI BRAIN STEM W/O & W/DYE: CPT | Mod: 26

## 2025-08-05 PROCEDURE — 70553 MRI BRAIN STEM W/O & W/DYE: CPT

## 2025-08-05 PROCEDURE — A9585: CPT

## 2025-08-20 ENCOUNTER — NON-APPOINTMENT (OUTPATIENT)
Age: 65
End: 2025-08-20

## 2025-08-20 ENCOUNTER — APPOINTMENT (OUTPATIENT)
Dept: SPINE | Facility: CLINIC | Age: 65
End: 2025-08-20
Payer: MEDICARE

## 2025-08-20 VITALS
WEIGHT: 106 LBS | HEIGHT: 62 IN | HEART RATE: 73 BPM | RESPIRATION RATE: 16 BRPM | OXYGEN SATURATION: 98 % | DIASTOLIC BLOOD PRESSURE: 80 MMHG | SYSTOLIC BLOOD PRESSURE: 119 MMHG | BODY MASS INDEX: 19.51 KG/M2

## 2025-08-20 DIAGNOSIS — D35.2 BENIGN NEOPLASM OF PITUITARY GLAND: ICD-10-CM

## 2025-08-20 PROCEDURE — 99213 OFFICE O/P EST LOW 20 MIN: CPT

## 2025-08-20 RX ORDER — EVOLOCUMAB 140 MG/ML
INJECTION, SOLUTION SUBCUTANEOUS
Refills: 0 | Status: ACTIVE | COMMUNITY